# Patient Record
Sex: MALE | Race: WHITE | NOT HISPANIC OR LATINO
[De-identification: names, ages, dates, MRNs, and addresses within clinical notes are randomized per-mention and may not be internally consistent; named-entity substitution may affect disease eponyms.]

---

## 2018-12-20 ENCOUNTER — HOSPITAL (OUTPATIENT)
Dept: OTHER | Age: 33
End: 2018-12-20
Attending: INTERNAL MEDICINE

## 2018-12-24 LAB — PATHOLOGIST NAME: NORMAL

## 2021-11-08 ENCOUNTER — HOSPITAL ENCOUNTER (OUTPATIENT)
Dept: GENERAL RADIOLOGY | Facility: HOSPITAL | Age: 36
Discharge: HOME | End: 2021-11-08
Attending: SPECIALIST
Payer: COMMERCIAL

## 2021-11-08 DIAGNOSIS — M54.16 LUMBAR RADICULOPATHY: ICD-10-CM

## 2021-11-08 PROCEDURE — 72110 X-RAY EXAM L-2 SPINE 4/>VWS: CPT | Performed by: SPECIALIST

## 2021-11-15 ENCOUNTER — HOSPITAL ENCOUNTER (OUTPATIENT)
Dept: MRI IMAGING | Age: 36
Discharge: HOME | End: 2021-11-15
Attending: SPECIALIST
Payer: COMMERCIAL

## 2021-11-15 DIAGNOSIS — M54.16 LUMBAR RADICULOPATHY: ICD-10-CM

## 2021-11-15 PROCEDURE — 72148 MRI LUMBAR SPINE W/O DYE: CPT | Performed by: SPECIALIST

## 2021-12-02 ENCOUNTER — HOSPITAL ENCOUNTER (EMERGENCY)
Facility: HOSPITAL | Age: 36
Discharge: HOME | End: 2021-12-02
Attending: EMERGENCY MEDICINE
Payer: COMMERCIAL

## 2021-12-02 ENCOUNTER — APPOINTMENT (OUTPATIENT)
Dept: GENERAL RADIOLOGY | Facility: HOSPITAL | Age: 36
End: 2021-12-02
Payer: COMMERCIAL

## 2021-12-02 VITALS
HEART RATE: 66 BPM | HEIGHT: 74 IN | BODY MASS INDEX: 38.5 KG/M2 | SYSTOLIC BLOOD PRESSURE: 119 MMHG | RESPIRATION RATE: 28 BRPM | DIASTOLIC BLOOD PRESSURE: 73 MMHG | WEIGHT: 300 LBS | OXYGEN SATURATION: 98 % | TEMPERATURE: 98 F

## 2021-12-02 DIAGNOSIS — U07.1 COVID-19: Primary | ICD-10-CM

## 2021-12-02 PROCEDURE — 80053 COMPREHEN METABOLIC PANEL: CPT | Performed by: EMERGENCY MEDICINE

## 2021-12-02 PROCEDURE — 71045 X-RAY EXAM CHEST 1 VIEW: CPT | Performed by: EMERGENCY MEDICINE

## 2021-12-02 PROCEDURE — 80053 COMPREHEN METABOLIC PANEL: CPT

## 2021-12-02 PROCEDURE — 85025 COMPLETE CBC W/AUTO DIFF WBC: CPT

## 2021-12-02 PROCEDURE — 85025 COMPLETE CBC W/AUTO DIFF WBC: CPT | Performed by: EMERGENCY MEDICINE

## 2021-12-02 PROCEDURE — 99284 EMERGENCY DEPT VISIT MOD MDM: CPT

## 2021-12-02 RX ORDER — DEXTROAMPHETAMINE SACCHARATE, AMPHETAMINE ASPARTATE, DEXTROAMPHETAMINE SULFATE AND AMPHETAMINE SULFATE 7.5; 7.5; 7.5; 7.5 MG/1; MG/1; MG/1; MG/1
TABLET ORAL
COMMUNITY
Start: 2021-11-17

## 2021-12-02 RX ORDER — CLONAZEPAM 1 MG/1
TABLET ORAL
COMMUNITY
Start: 2021-11-17

## 2021-12-02 NOTE — ED PROVIDER NOTES
Patient Seen in: [facility] Emergency Department      History   Patient presents with:  Covid    Stated Complaint: PCP sent over for an IV infusion through ED    Subjective:   HPI    79-year-old male, unvaccinated for COVID-19, presents today for Co equal, round, and reactive to light. Cardiovascular:      Rate and Rhythm: Normal rate and regular rhythm. Pulmonary:      Effort: Pulmonary effort is normal.      Breath sounds: Normal breath sounds. Abdominal:      Palpations: Abdomen is soft. and hilar contours are normal.  No focal consolidation. CONCLUSION:  No acute abnormality is seen.    Dictated by (CST): Luis Eduardo Kaiser MD on 12/02/2021 at 11:05 AM     Finalized by (CST): Luis Eduardo Kaiser MD on 12/02/2021 at 11:07 AM              MDM Clinical Impression:  COVID-19  (primary encounter diagnosis)     Disposition:  Discharge  12/2/2021 11:50 am    Follow-up:   Gabi Santos [phone]    Call in 1 day        The patient has evidence of C

## 2021-12-02 NOTE — ED INITIAL ASSESSMENT (HPI)
Pt here due to his kids having COVID, he is not vaccinated, he is having, headache, chest congestion, and eye pain

## 2022-10-12 ENCOUNTER — EXTERNAL RECORD (OUTPATIENT)
Dept: OTHER | Age: 37
End: 2022-10-12

## 2022-12-12 ENCOUNTER — CLINICAL ABSTRACT (OUTPATIENT)
Dept: OTHER | Age: 37
End: 2022-12-12

## 2022-12-12 VITALS — SYSTOLIC BLOOD PRESSURE: 120 MMHG | DIASTOLIC BLOOD PRESSURE: 73 MMHG

## 2022-12-17 PROBLEM — K61.1 PERIRECTAL ABSCESS: Status: ACTIVE | Noted: 2022-12-17

## 2022-12-17 PROBLEM — K60.40 PERIRECTAL FISTULA: Status: ACTIVE | Noted: 2022-12-17

## 2022-12-17 PROBLEM — K60.4 PERIRECTAL FISTULA: Status: ACTIVE | Noted: 2022-12-17

## 2023-01-07 ENCOUNTER — LAB ENCOUNTER (OUTPATIENT)
Dept: LAB | Facility: HOSPITAL | Age: 38
End: 2023-01-07
Attending: INTERNAL MEDICINE
Payer: COMMERCIAL

## 2023-01-07 DIAGNOSIS — Z01.818 PRE-OP TESTING: ICD-10-CM

## 2023-01-07 LAB — SARS-COV-2 RNA RESP QL NAA+PROBE: NOT DETECTED

## 2023-01-10 ENCOUNTER — HOSPITAL ENCOUNTER (OUTPATIENT)
Facility: HOSPITAL | Age: 38
Setting detail: HOSPITAL OUTPATIENT SURGERY
Discharge: HOME | End: 2023-01-10
Attending: INTERNAL MEDICINE | Admitting: INTERNAL MEDICINE
Payer: COMMERCIAL

## 2023-01-10 ENCOUNTER — ANESTHESIA EVENT (OUTPATIENT)
Dept: ENDOSCOPY | Facility: HOSPITAL | Age: 38
End: 2023-01-10
Payer: COMMERCIAL

## 2023-01-10 ENCOUNTER — ANESTHESIA (OUTPATIENT)
Dept: ENDOSCOPY | Facility: HOSPITAL | Age: 38
End: 2023-01-10
Payer: COMMERCIAL

## 2023-01-10 ENCOUNTER — OFFICE VISIT (OUTPATIENT)
Facility: LOCATION | Age: 38
End: 2023-01-10
Payer: COMMERCIAL

## 2023-01-10 VITALS — HEART RATE: 96 BPM | TEMPERATURE: 97 F

## 2023-01-10 VITALS
HEART RATE: 68 BPM | HEIGHT: 74 IN | DIASTOLIC BLOOD PRESSURE: 54 MMHG | BODY MASS INDEX: 40.17 KG/M2 | WEIGHT: 313 LBS | TEMPERATURE: 98 F | RESPIRATION RATE: 16 BRPM | SYSTOLIC BLOOD PRESSURE: 102 MMHG | OXYGEN SATURATION: 100 %

## 2023-01-10 DIAGNOSIS — Z01.818 PRE-OP TESTING: Primary | ICD-10-CM

## 2023-01-10 DIAGNOSIS — K61.1 PERIRECTAL ABSCESS: ICD-10-CM

## 2023-01-10 DIAGNOSIS — K60.4 PERIRECTAL FISTULA: Primary | ICD-10-CM

## 2023-01-10 DIAGNOSIS — K60.4 PERIRECTAL FISTULA: ICD-10-CM

## 2023-01-10 PROCEDURE — 88305 TISSUE EXAM BY PATHOLOGIST: CPT | Performed by: INTERNAL MEDICINE

## 2023-01-10 PROCEDURE — 0DJD8ZZ INSPECTION OF LOWER INTESTINAL TRACT, VIA NATURAL OR ARTIFICIAL OPENING ENDOSCOPIC: ICD-10-PCS | Performed by: INTERNAL MEDICINE

## 2023-01-10 PROCEDURE — 88342 IMHCHEM/IMCYTCHM 1ST ANTB: CPT | Performed by: INTERNAL MEDICINE

## 2023-01-10 PROCEDURE — 0DBB8ZX EXCISION OF ILEUM, VIA NATURAL OR ARTIFICIAL OPENING ENDOSCOPIC, DIAGNOSTIC: ICD-10-PCS | Performed by: INTERNAL MEDICINE

## 2023-01-10 PROCEDURE — 88341 IMHCHEM/IMCYTCHM EA ADD ANTB: CPT | Performed by: INTERNAL MEDICINE

## 2023-01-10 PROCEDURE — 0DBE8ZX EXCISION OF LARGE INTESTINE, VIA NATURAL OR ARTIFICIAL OPENING ENDOSCOPIC, DIAGNOSTIC: ICD-10-PCS | Performed by: INTERNAL MEDICINE

## 2023-01-10 RX ORDER — LIDOCAINE HYDROCHLORIDE 10 MG/ML
INJECTION, SOLUTION EPIDURAL; INFILTRATION; INTRACAUDAL; PERINEURAL AS NEEDED
Status: DISCONTINUED | OUTPATIENT
Start: 2023-01-10 | End: 2023-01-10 | Stop reason: SURG

## 2023-01-10 RX ORDER — SODIUM CHLORIDE, SODIUM LACTATE, POTASSIUM CHLORIDE, CALCIUM CHLORIDE 600; 310; 30; 20 MG/100ML; MG/100ML; MG/100ML; MG/100ML
INJECTION, SOLUTION INTRAVENOUS CONTINUOUS
Status: DISCONTINUED | OUTPATIENT
Start: 2023-01-10 | End: 2023-01-10

## 2023-01-10 RX ADMIN — LIDOCAINE HYDROCHLORIDE 50 MG: 10 INJECTION, SOLUTION EPIDURAL; INFILTRATION; INTRACAUDAL; PERINEURAL at 11:40:00

## 2023-01-10 NOTE — OPERATIVE REPORT
Geoffrey Candelaria Patient Status:  Hospital Outpatient Surgery    1985 MRN [id number]   Location [id number] [place] [address] Attending Kendell Rodriguez MD   Date 1/10/2023 PCP Ivis Calderon MD     PREOPERATIVE DIAGNOSIS/INDICATION: Family ho colon cancer, perianal possible fistula  POSTOPERTATIVE DIAGNOSIS: Same, ileitis  PROCEDURE PERFORMED: COLONOSCOPY/EUS  SEDATION: MAC sedation provided by General Anesthesia    TIME OUT WAS PERFORMED    INFORMED CONSENT: Risks, benefits and alternatives to the procedure were explained to the patient including but not limited to bleeding, infection, perforation, adverse drug reactions, pancreatitis and the need for hospitalization and surgery if this occurs, the patient understands and agrees to procedure. PROCEDURE DESCRIPTION: After careful digital rectal examination a pediatric colonoscope was introduced into the patients rectum, advanced pass the recto sigmoid junction, into the descending colon, splenic flexure, transverse colon, hepatic flexure, ascending colon, cecum and the last 5-10cm of the terminal ileum, confirmed by landmarks, including the appendiceal orifice and ileocecal valve. Careful examination of the above described areas was performed on withdrawal of the endoscope. Retroflexion was performed on the rectum, after this a radial echoendoscope was introduced into the rectum and advanced to the sigmoid colon. The patient tolerated the procedure well, there were no immediate complication immediately following the procedure, and the patient was transferred to recovery in stable condition.   QUALITY OF PREPARATION: Crosby Bowel Preparation Scale:            -      Right colon 3, Transverse colon 3, Left colon 3   FINDINGS/THERAPEUTICS:  - TERMINAL ILEUM: Aphthous ulceration and erosions of the terminal ileum s/p cold forceps biopsies  - COLON: Normal mucosa s/p cold forceps biopsies  - RECTUM: Grade 2  Internal hemorrhoids  - EUS: There was a fistula tract transsphincteric type 2  RECOMMENDATIONS:   - Post Colonoscopy/biopsy precautions, watch for bleeding, infection, perforation, adverse drug reactions   - Follow biopsies.  - Follow with colorectal surgery  - Repeat colonoscopy in 5 years.     Swati Hurtado MD  1/10/2023  12:06 PM

## 2023-01-10 NOTE — ANESTHESIA POSTPROCEDURE EVALUATION
[address] Patient Status:  Hospital Outpatient Surgery   Age/Gender 30year old male MRN [id number]   Location [address] [address] Attending Kendell Rodriguez MD   Hosp Day # 0 PCP Ivis Calderon MD       Anesthesia Post-op Note    ENDOSCOPIC ULTRASOUND (EUS), COLONOSCOPY with biopsy    Procedure Summary     Date: 01/10/23 Room / Location: [facility] ENDOSCOPY 02 / [facility] ENDOSCOPY    Anesthesia Start: 4557 Anesthesia Stop: 7027    Procedures:       ENDOSCOPIC ULTRASOUND (EUS), COLONOSCOPY with biopsy      ENDOSCOPIC ULTRASOUND (EUS), COLONOSCOPY Diagnosis:       Perirectal abscess      Perirectal fistula      (hermorrhoids, ileitis  EUS; perianal fistula )    Surgeons: Kendell Rodriguez MD Anesthesiologist: Jonatan Tellez MD    Anesthesia Type: MAC ASA Status: 2          Anesthesia Type: MAC    Vitals Value Taken Time   /57 01/10/23 1213   Temp  01/10/23 1218   Pulse 62 01/10/23 1217   Resp 16 01/10/23 1210   SpO2 97 % 01/10/23 1217   Vitals shown include unvalidated device data. Patient Location: Endoscopy    Anesthesia Type: MAC    Airway Patency: patent    Postop Pain Control: adequate    Mental Status: mildly sedated but able to meaningfully participate in the post-anesthesia evaluation    Nausea/Vomiting: none    Cardiopulmonary/Hydration status: stable euvolemic    Complications: no apparent anesthesia related complications    Postop vital signs: stable    Dental Exam: Unchanged from Preop    Patient to be discharged home when criteria met.

## 2023-01-10 NOTE — DISCHARGE INSTRUCTIONS
Home Care Instructions for Colonoscopy and/or Endoscopic Ultrasound (EUS) with Sedation    Diet:  - Resume your regular diet as tolerated unless otherwise instructed. - Start with light meals to minimize bloating.  - Do not drink alcohol today. Medication:  - If you have questions about resuming your normal medications, please contact your Primary Care Physician. Activities:  - Take it easy today. Do not return to work today. - Do not drive today. - Do not operate any machinery today (including kitchen equipment). Colonoscopy:  - You may notice some rectal \"spotting\" (a little blood on the toilet tissue) for a day or two after the exam. This is normal.  - If you experience any rectal bleeding (not spotting), persistent tenderness or sharp severe abdominal pains, oral temperature over 100 degrees Fahrenheit, light-headedness or dizziness, or any other problems, contact your doctor. EUS:  - You may have a sore throat for 2-3 days following the exam. This is normal. Gargling with warm salt water (1/2 tsp salt to 1 glass warm water) or using throat lozenges will help. - If you experience any sharp pain in your neck, abdomen or chest, vomiting of blood, oral temperature over 100 degrees Fahrenheit, light-headedness or dizziness, or any other problems, contact your doctor. **If unable to reach your doctor, please go to the [facility] Emergency Room**    - Your referring physician will receive a full report of your examination.  - If you do not hear from your doctor's office within two weeks of your biopsy, please call them for your results. You may be able to see your laboratory results in [facility] between 4 and 7 business days. In some cases, your physician may not have viewed the results before they are released to [address] If you have questions regarding your results contact the physician who ordered the test/exam by phone or via [address] by choosing \"Ask a Medical Question. \"

## 2023-01-19 ENCOUNTER — OFFICE VISIT (OUTPATIENT)
Facility: LOCATION | Age: 38
End: 2023-01-19
Payer: COMMERCIAL

## 2023-01-19 VITALS — HEART RATE: 63 BPM | TEMPERATURE: 97 F

## 2023-01-19 DIAGNOSIS — K60.4 PERIRECTAL FISTULA: Primary | ICD-10-CM

## 2023-01-19 PROCEDURE — 46600 DIAGNOSTIC ANOSCOPY SPX: CPT | Performed by: STUDENT IN AN ORGANIZED HEALTH CARE EDUCATION/TRAINING PROGRAM

## 2023-01-19 PROCEDURE — 99203 OFFICE O/P NEW LOW 30 MIN: CPT | Performed by: STUDENT IN AN ORGANIZED HEALTH CARE EDUCATION/TRAINING PROGRAM

## 2023-03-06 ENCOUNTER — OFFICE VISIT (OUTPATIENT)
Facility: LOCATION | Age: 38
End: 2023-03-06
Payer: COMMERCIAL

## 2023-03-06 VITALS — HEART RATE: 66 BPM | TEMPERATURE: 99 F

## 2023-03-06 DIAGNOSIS — K60.3 ANAL FISTULA: Primary | ICD-10-CM

## 2023-03-15 ENCOUNTER — LAB ENCOUNTER (OUTPATIENT)
Dept: LAB | Age: 38
End: 2023-03-15
Attending: STUDENT IN AN ORGANIZED HEALTH CARE EDUCATION/TRAINING PROGRAM
Payer: COMMERCIAL

## 2023-03-15 DIAGNOSIS — Z01.818 PRE-OP TESTING: ICD-10-CM

## 2023-03-16 ENCOUNTER — ANESTHESIA EVENT (OUTPATIENT)
Dept: SURGERY | Facility: HOSPITAL | Age: 38
End: 2023-03-16
Payer: COMMERCIAL

## 2023-03-16 LAB — SARS-COV-2 RNA RESP QL NAA+PROBE: NOT DETECTED

## 2023-03-17 ENCOUNTER — HOSPITAL ENCOUNTER (OUTPATIENT)
Facility: HOSPITAL | Age: 38
Setting detail: HOSPITAL OUTPATIENT SURGERY
Discharge: HOME | End: 2023-03-17
Attending: STUDENT IN AN ORGANIZED HEALTH CARE EDUCATION/TRAINING PROGRAM | Admitting: STUDENT IN AN ORGANIZED HEALTH CARE EDUCATION/TRAINING PROGRAM
Payer: COMMERCIAL

## 2023-03-17 ENCOUNTER — ANESTHESIA (OUTPATIENT)
Dept: SURGERY | Facility: HOSPITAL | Age: 38
End: 2023-03-17
Payer: COMMERCIAL

## 2023-03-17 VITALS
OXYGEN SATURATION: 96 % | HEIGHT: 74 IN | BODY MASS INDEX: 39.53 KG/M2 | DIASTOLIC BLOOD PRESSURE: 74 MMHG | SYSTOLIC BLOOD PRESSURE: 117 MMHG | WEIGHT: 308 LBS | HEART RATE: 53 BPM | TEMPERATURE: 98 F | RESPIRATION RATE: 16 BRPM

## 2023-03-17 DIAGNOSIS — K60.3 ANAL FISTULA: ICD-10-CM

## 2023-03-17 DIAGNOSIS — Z01.818 PRE-OP TESTING: Primary | ICD-10-CM

## 2023-03-17 PROCEDURE — 46606 ANOSCOPY AND BIOPSY: CPT | Performed by: STUDENT IN AN ORGANIZED HEALTH CARE EDUCATION/TRAINING PROGRAM

## 2023-03-17 PROCEDURE — 0DBQ8ZZ EXCISION OF ANUS, VIA NATURAL OR ARTIFICIAL OPENING ENDOSCOPIC: ICD-10-PCS | Performed by: STUDENT IN AN ORGANIZED HEALTH CARE EDUCATION/TRAINING PROGRAM

## 2023-03-17 PROCEDURE — 46060 I&D ISCHIORECTAL/NTRMRL ABSC: CPT | Performed by: STUDENT IN AN ORGANIZED HEALTH CARE EDUCATION/TRAINING PROGRAM

## 2023-03-17 PROCEDURE — 0DBP8ZZ EXCISION OF RECTUM, VIA NATURAL OR ARTIFICIAL OPENING ENDOSCOPIC: ICD-10-PCS | Performed by: STUDENT IN AN ORGANIZED HEALTH CARE EDUCATION/TRAINING PROGRAM

## 2023-03-17 RX ORDER — CEFOXITIN 2 G/1
INJECTION, POWDER, FOR SOLUTION INTRAVENOUS
Status: DISCONTINUED
Start: 2023-03-17 | End: 2023-03-17

## 2023-03-17 RX ORDER — PROCHLORPERAZINE EDISYLATE 5 MG/ML
5 INJECTION INTRAMUSCULAR; INTRAVENOUS EVERY 8 HOURS PRN
Status: DISCONTINUED | OUTPATIENT
Start: 2023-03-17 | End: 2023-03-17

## 2023-03-17 RX ORDER — HYDROMORPHONE HYDROCHLORIDE 1 MG/ML
0.6 INJECTION, SOLUTION INTRAMUSCULAR; INTRAVENOUS; SUBCUTANEOUS EVERY 5 MIN PRN
Status: DISCONTINUED | OUTPATIENT
Start: 2023-03-17 | End: 2023-03-17

## 2023-03-17 RX ORDER — SODIUM CHLORIDE, SODIUM LACTATE, POTASSIUM CHLORIDE, CALCIUM CHLORIDE 600; 310; 30; 20 MG/100ML; MG/100ML; MG/100ML; MG/100ML
INJECTION, SOLUTION INTRAVENOUS CONTINUOUS
Status: DISCONTINUED | OUTPATIENT
Start: 2023-03-17 | End: 2023-03-17

## 2023-03-17 RX ORDER — MIDAZOLAM HYDROCHLORIDE 1 MG/ML
1 INJECTION INTRAMUSCULAR; INTRAVENOUS EVERY 5 MIN PRN
Status: DISCONTINUED | OUTPATIENT
Start: 2023-03-17 | End: 2023-03-17

## 2023-03-17 RX ORDER — MEPERIDINE HYDROCHLORIDE 25 MG/ML
12.5 INJECTION INTRAMUSCULAR; INTRAVENOUS; SUBCUTANEOUS AS NEEDED
Status: DISCONTINUED | OUTPATIENT
Start: 2023-03-17 | End: 2023-03-17

## 2023-03-17 RX ORDER — BUPIVACAINE HYDROCHLORIDE 2.5 MG/ML
INJECTION, SOLUTION EPIDURAL; INFILTRATION; INTRACAUDAL AS NEEDED
Status: DISCONTINUED | OUTPATIENT
Start: 2023-03-17 | End: 2023-03-17 | Stop reason: HOSPADM

## 2023-03-17 RX ORDER — NALOXONE HYDROCHLORIDE 0.4 MG/ML
80 INJECTION, SOLUTION INTRAMUSCULAR; INTRAVENOUS; SUBCUTANEOUS AS NEEDED
Status: DISCONTINUED | OUTPATIENT
Start: 2023-03-17 | End: 2023-03-17

## 2023-03-17 RX ORDER — HYDROCODONE BITARTRATE AND ACETAMINOPHEN 5; 325 MG/1; MG/1
1 TABLET ORAL EVERY 6 HOURS PRN
Qty: 20 TABLET | Refills: 0 | Status: SHIPPED | OUTPATIENT
Start: 2023-03-17

## 2023-03-17 RX ORDER — HYDROCODONE BITARTRATE AND ACETAMINOPHEN 10; 325 MG/1; MG/1
1 TABLET ORAL ONCE AS NEEDED
Status: DISCONTINUED | OUTPATIENT
Start: 2023-03-17 | End: 2023-03-17

## 2023-03-17 RX ORDER — ROCURONIUM BROMIDE 10 MG/ML
INJECTION, SOLUTION INTRAVENOUS AS NEEDED
Status: DISCONTINUED | OUTPATIENT
Start: 2023-03-17 | End: 2023-03-17 | Stop reason: SURG

## 2023-03-17 RX ORDER — HYDROCODONE BITARTRATE AND ACETAMINOPHEN 10; 325 MG/1; MG/1
2 TABLET ORAL ONCE AS NEEDED
Status: DISCONTINUED | OUTPATIENT
Start: 2023-03-17 | End: 2023-03-17

## 2023-03-17 RX ORDER — LABETALOL HYDROCHLORIDE 5 MG/ML
5 INJECTION, SOLUTION INTRAVENOUS EVERY 5 MIN PRN
Status: DISCONTINUED | OUTPATIENT
Start: 2023-03-17 | End: 2023-03-17

## 2023-03-17 RX ORDER — GLYCOPYRROLATE 0.2 MG/ML
INJECTION, SOLUTION INTRAMUSCULAR; INTRAVENOUS AS NEEDED
Status: DISCONTINUED | OUTPATIENT
Start: 2023-03-17 | End: 2023-03-17 | Stop reason: SURG

## 2023-03-17 RX ORDER — SCOLOPAMINE TRANSDERMAL SYSTEM 1 MG/1
1 PATCH, EXTENDED RELEASE TRANSDERMAL ONCE
Status: DISCONTINUED | OUTPATIENT
Start: 2023-03-17 | End: 2023-03-17 | Stop reason: HOSPADM

## 2023-03-17 RX ORDER — ONDANSETRON 2 MG/ML
4 INJECTION INTRAMUSCULAR; INTRAVENOUS EVERY 6 HOURS PRN
Status: DISCONTINUED | OUTPATIENT
Start: 2023-03-17 | End: 2023-03-17

## 2023-03-17 RX ORDER — HYDROMORPHONE HYDROCHLORIDE 1 MG/ML
0.4 INJECTION, SOLUTION INTRAMUSCULAR; INTRAVENOUS; SUBCUTANEOUS EVERY 5 MIN PRN
Status: DISCONTINUED | OUTPATIENT
Start: 2023-03-17 | End: 2023-03-17

## 2023-03-17 RX ORDER — ACETAMINOPHEN 500 MG
1000 TABLET ORAL ONCE AS NEEDED
Status: DISCONTINUED | OUTPATIENT
Start: 2023-03-17 | End: 2023-03-17

## 2023-03-17 RX ORDER — LIDOCAINE HYDROCHLORIDE 10 MG/ML
INJECTION, SOLUTION EPIDURAL; INFILTRATION; INTRACAUDAL; PERINEURAL AS NEEDED
Status: DISCONTINUED | OUTPATIENT
Start: 2023-03-17 | End: 2023-03-17 | Stop reason: SURG

## 2023-03-17 RX ORDER — KETOROLAC TROMETHAMINE 30 MG/ML
INJECTION, SOLUTION INTRAMUSCULAR; INTRAVENOUS AS NEEDED
Status: DISCONTINUED | OUTPATIENT
Start: 2023-03-17 | End: 2023-03-17 | Stop reason: SURG

## 2023-03-17 RX ORDER — 0.9 % SODIUM CHLORIDE 0.9 %
INTRAVENOUS SOLUTION INTRAVENOUS
Status: DISCONTINUED
Start: 2023-03-17 | End: 2023-03-17

## 2023-03-17 RX ORDER — ONDANSETRON 2 MG/ML
INJECTION INTRAMUSCULAR; INTRAVENOUS AS NEEDED
Status: DISCONTINUED | OUTPATIENT
Start: 2023-03-17 | End: 2023-03-17 | Stop reason: SURG

## 2023-03-17 RX ORDER — DEXAMETHASONE SODIUM PHOSPHATE 4 MG/ML
VIAL (ML) INJECTION AS NEEDED
Status: DISCONTINUED | OUTPATIENT
Start: 2023-03-17 | End: 2023-03-17 | Stop reason: SURG

## 2023-03-17 RX ORDER — ACETAMINOPHEN 500 MG
1000 TABLET ORAL ONCE
Status: DISCONTINUED | OUTPATIENT
Start: 2023-03-17 | End: 2023-03-17 | Stop reason: HOSPADM

## 2023-03-17 RX ORDER — NEOSTIGMINE METHYLSULFATE 1 MG/ML
INJECTION, SOLUTION INTRAVENOUS AS NEEDED
Status: DISCONTINUED | OUTPATIENT
Start: 2023-03-17 | End: 2023-03-17 | Stop reason: SURG

## 2023-03-17 RX ORDER — HYDROMORPHONE HYDROCHLORIDE 1 MG/ML
0.2 INJECTION, SOLUTION INTRAMUSCULAR; INTRAVENOUS; SUBCUTANEOUS EVERY 5 MIN PRN
Status: DISCONTINUED | OUTPATIENT
Start: 2023-03-17 | End: 2023-03-17

## 2023-03-17 RX ADMIN — ROCURONIUM BROMIDE 50 MG: 10 INJECTION, SOLUTION INTRAVENOUS at 12:54:00

## 2023-03-17 RX ADMIN — SODIUM CHLORIDE, SODIUM LACTATE, POTASSIUM CHLORIDE, CALCIUM CHLORIDE: 600; 310; 30; 20 INJECTION, SOLUTION INTRAVENOUS at 14:11:00

## 2023-03-17 RX ADMIN — ONDANSETRON 4 MG: 2 INJECTION INTRAMUSCULAR; INTRAVENOUS at 13:56:00

## 2023-03-17 RX ADMIN — GLYCOPYRROLATE 0.4 MG: 0.2 INJECTION, SOLUTION INTRAMUSCULAR; INTRAVENOUS at 14:02:00

## 2023-03-17 RX ADMIN — KETOROLAC TROMETHAMINE 30 MG: 30 INJECTION, SOLUTION INTRAMUSCULAR; INTRAVENOUS at 13:56:00

## 2023-03-17 RX ADMIN — LIDOCAINE HYDROCHLORIDE 50 MG: 10 INJECTION, SOLUTION EPIDURAL; INFILTRATION; INTRACAUDAL; PERINEURAL at 12:54:00

## 2023-03-17 RX ADMIN — ROCURONIUM BROMIDE 10 MG: 10 INJECTION, SOLUTION INTRAVENOUS at 13:39:00

## 2023-03-17 RX ADMIN — DEXAMETHASONE SODIUM PHOSPHATE 8 MG: 4 MG/ML VIAL (ML) INJECTION at 12:54:00

## 2023-03-17 RX ADMIN — NEOSTIGMINE METHYLSULFATE 5 MG: 1 INJECTION, SOLUTION INTRAVENOUS at 14:02:00

## 2023-03-17 NOTE — INTERVAL H&P NOTE
Pre-op Diagnosis: Anal fistula [K60.3]    The above referenced H&P was reviewed by Mi Lagos MD on 3/17/2023, the patient was examined and no significant changes have occurred in the patient's condition since the H&P was performed. I discussed with the patient and/or legal representative the potential benefits, risks and side effects of this procedure; the likelihood of the patient achieving goals; and potential problems that might occur during recuperation. I discussed reasonable alternatives to the procedure, including risks, benefits and side effects related to the alternatives and risks related to not receiving this procedure. We will proceed with procedure as planned.

## 2023-03-17 NOTE — ANESTHESIA PROCEDURE NOTES
Airway  Date/Time: 3/17/2023 12:56 PM  Urgency: elective    Airway not difficult    General Information and Staff    Patient location during procedure: OR  Anesthesiologist: Padmini Cid MD  Performed: anesthesiologist   Performed by: Padmini Cid MD  Authorized by: Padmini Cid MD      Indications and Patient Condition  Indications for airway management: anesthesia  Sedation level: deep  Preoxygenated: yes  Patient position: sniffing  Mask difficulty assessment: 1 - vent by mask    Final Airway Details  Final airway type: endotracheal airway      Successful airway: ETT  Cuffed: yes   Successful intubation technique: direct laryngoscopy  Endotracheal tube insertion site: oral  Blade: Demarcus  Blade size: #4  ETT size (mm): 7.0    Cormack-Lehane Classification: grade I - full view of glottis  Placement verified by: chest auscultation and capnometry   Measured from: lips  ETT to lips (cm): 22  Number of attempts at approach: 1

## 2023-04-03 ENCOUNTER — OFFICE VISIT (OUTPATIENT)
Facility: LOCATION | Age: 38
End: 2023-04-03

## 2023-04-03 VITALS — TEMPERATURE: 99 F | HEART RATE: 63 BPM

## 2023-04-03 DIAGNOSIS — K60.3 ANAL FISTULA: Primary | ICD-10-CM

## 2023-04-03 PROCEDURE — 99024 POSTOP FOLLOW-UP VISIT: CPT | Performed by: STUDENT IN AN ORGANIZED HEALTH CARE EDUCATION/TRAINING PROGRAM

## 2023-04-17 RX ORDER — HEPARIN SODIUM 5000 [USP'U]/ML
5000 INJECTION, SOLUTION INTRAVENOUS; SUBCUTANEOUS ONCE
Status: CANCELLED | OUTPATIENT
Start: 2023-04-17 | End: 2023-04-17

## 2023-04-26 ENCOUNTER — TELEPHONE (OUTPATIENT)
Facility: LOCATION | Age: 38
End: 2023-04-26

## 2023-04-26 DIAGNOSIS — K60.3 ANAL FISTULA: Primary | ICD-10-CM

## 2023-05-02 ENCOUNTER — ANESTHESIA EVENT (OUTPATIENT)
Dept: SURGERY | Facility: HOSPITAL | Age: 38
End: 2023-05-02
Payer: COMMERCIAL

## 2023-05-02 ENCOUNTER — TELEPHONE (OUTPATIENT)
Facility: LOCATION | Age: 38
End: 2023-05-02

## 2023-05-02 NOTE — TELEPHONE ENCOUNTER
Pt called to inform that on Monday 5/1 his insurance changed. He didn't realize that the insurance would change until last-minute. Pt does not have any insurance information. He said to call Roslyn Cummings at [phone] for insurance inforamiton.      Tried to call Taryn Matta, and left a voicemail for a call back

## 2023-05-03 ENCOUNTER — ANESTHESIA (OUTPATIENT)
Dept: SURGERY | Facility: HOSPITAL | Age: 38
End: 2023-05-03
Payer: COMMERCIAL

## 2023-05-03 ENCOUNTER — HOSPITAL ENCOUNTER (OUTPATIENT)
Facility: HOSPITAL | Age: 38
Setting detail: HOSPITAL OUTPATIENT SURGERY
Discharge: HOME | End: 2023-05-03
Attending: STUDENT IN AN ORGANIZED HEALTH CARE EDUCATION/TRAINING PROGRAM | Admitting: STUDENT IN AN ORGANIZED HEALTH CARE EDUCATION/TRAINING PROGRAM
Payer: COMMERCIAL

## 2023-05-03 VITALS
OXYGEN SATURATION: 97 % | HEIGHT: 74 IN | SYSTOLIC BLOOD PRESSURE: 130 MMHG | WEIGHT: 300 LBS | DIASTOLIC BLOOD PRESSURE: 74 MMHG | BODY MASS INDEX: 38.5 KG/M2 | RESPIRATION RATE: 20 BRPM | TEMPERATURE: 98 F | HEART RATE: 60 BPM

## 2023-05-03 DIAGNOSIS — K60.3 ANAL FISTULA: ICD-10-CM

## 2023-05-03 PROCEDURE — 46285 REMOVE ANAL FIST 2 STAGE: CPT | Performed by: STUDENT IN AN ORGANIZED HEALTH CARE EDUCATION/TRAINING PROGRAM

## 2023-05-03 PROCEDURE — 0DBQ7ZZ EXCISION OF ANUS, VIA NATURAL OR ARTIFICIAL OPENING: ICD-10-PCS | Performed by: STUDENT IN AN ORGANIZED HEALTH CARE EDUCATION/TRAINING PROGRAM

## 2023-05-03 RX ORDER — ACETAMINOPHEN 500 MG
1000 TABLET ORAL ONCE AS NEEDED
Status: DISCONTINUED | OUTPATIENT
Start: 2023-05-03 | End: 2023-05-03

## 2023-05-03 RX ORDER — ONDANSETRON 2 MG/ML
4 INJECTION INTRAMUSCULAR; INTRAVENOUS EVERY 6 HOURS PRN
Status: DISCONTINUED | OUTPATIENT
Start: 2023-05-03 | End: 2023-05-03

## 2023-05-03 RX ORDER — HYDROMORPHONE HYDROCHLORIDE 1 MG/ML
0.6 INJECTION, SOLUTION INTRAMUSCULAR; INTRAVENOUS; SUBCUTANEOUS EVERY 5 MIN PRN
Status: DISCONTINUED | OUTPATIENT
Start: 2023-05-03 | End: 2023-05-03

## 2023-05-03 RX ORDER — ALBUTEROL SULFATE 90 UG/1
1 AEROSOL, METERED RESPIRATORY (INHALATION) EVERY 4 HOURS PRN
COMMUNITY
Start: 2023-04-11

## 2023-05-03 RX ORDER — DEXAMETHASONE SODIUM PHOSPHATE 4 MG/ML
VIAL (ML) INJECTION AS NEEDED
Status: DISCONTINUED | OUTPATIENT
Start: 2023-05-03 | End: 2023-05-03 | Stop reason: SURG

## 2023-05-03 RX ORDER — ONDANSETRON 2 MG/ML
INJECTION INTRAMUSCULAR; INTRAVENOUS AS NEEDED
Status: DISCONTINUED | OUTPATIENT
Start: 2023-05-03 | End: 2023-05-03 | Stop reason: SURG

## 2023-05-03 RX ORDER — HYDROMORPHONE HYDROCHLORIDE 1 MG/ML
0.2 INJECTION, SOLUTION INTRAMUSCULAR; INTRAVENOUS; SUBCUTANEOUS EVERY 5 MIN PRN
Status: DISCONTINUED | OUTPATIENT
Start: 2023-05-03 | End: 2023-05-03

## 2023-05-03 RX ORDER — HYDROCODONE BITARTRATE AND ACETAMINOPHEN 7.5; 325 MG/1; MG/1
1 TABLET ORAL EVERY 6 HOURS PRN
Qty: 15 TABLET | Refills: 0 | Status: SHIPPED | OUTPATIENT
Start: 2023-05-03

## 2023-05-03 RX ORDER — HYDROCODONE BITARTRATE AND ACETAMINOPHEN 5; 325 MG/1; MG/1
2 TABLET ORAL ONCE AS NEEDED
Status: DISCONTINUED | OUTPATIENT
Start: 2023-05-03 | End: 2023-05-03

## 2023-05-03 RX ORDER — PROCHLORPERAZINE EDISYLATE 5 MG/ML
5 INJECTION INTRAMUSCULAR; INTRAVENOUS EVERY 8 HOURS PRN
Status: DISCONTINUED | OUTPATIENT
Start: 2023-05-03 | End: 2023-05-03

## 2023-05-03 RX ORDER — KETOROLAC TROMETHAMINE 30 MG/ML
INJECTION, SOLUTION INTRAMUSCULAR; INTRAVENOUS AS NEEDED
Status: DISCONTINUED | OUTPATIENT
Start: 2023-05-03 | End: 2023-05-03 | Stop reason: SURG

## 2023-05-03 RX ORDER — HYDROMORPHONE HYDROCHLORIDE 1 MG/ML
0.4 INJECTION, SOLUTION INTRAMUSCULAR; INTRAVENOUS; SUBCUTANEOUS EVERY 5 MIN PRN
Status: DISCONTINUED | OUTPATIENT
Start: 2023-05-03 | End: 2023-05-03

## 2023-05-03 RX ORDER — SCOLOPAMINE TRANSDERMAL SYSTEM 1 MG/1
1 PATCH, EXTENDED RELEASE TRANSDERMAL ONCE
Status: DISCONTINUED | OUTPATIENT
Start: 2023-05-03 | End: 2023-05-03 | Stop reason: HOSPADM

## 2023-05-03 RX ORDER — SODIUM CHLORIDE, SODIUM LACTATE, POTASSIUM CHLORIDE, CALCIUM CHLORIDE 600; 310; 30; 20 MG/100ML; MG/100ML; MG/100ML; MG/100ML
INJECTION, SOLUTION INTRAVENOUS CONTINUOUS
Status: DISCONTINUED | OUTPATIENT
Start: 2023-05-03 | End: 2023-05-03

## 2023-05-03 RX ORDER — ROCURONIUM BROMIDE 10 MG/ML
INJECTION, SOLUTION INTRAVENOUS AS NEEDED
Status: DISCONTINUED | OUTPATIENT
Start: 2023-05-03 | End: 2023-05-03 | Stop reason: SURG

## 2023-05-03 RX ORDER — BUPIVACAINE HYDROCHLORIDE AND EPINEPHRINE 2.5; 5 MG/ML; UG/ML
INJECTION, SOLUTION EPIDURAL; INFILTRATION; INTRACAUDAL; PERINEURAL AS NEEDED
Status: DISCONTINUED | OUTPATIENT
Start: 2023-05-03 | End: 2023-05-03 | Stop reason: HOSPADM

## 2023-05-03 RX ORDER — LIDOCAINE HYDROCHLORIDE 10 MG/ML
INJECTION, SOLUTION EPIDURAL; INFILTRATION; INTRACAUDAL; PERINEURAL AS NEEDED
Status: DISCONTINUED | OUTPATIENT
Start: 2023-05-03 | End: 2023-05-03 | Stop reason: SURG

## 2023-05-03 RX ORDER — GLYCOPYRROLATE 0.2 MG/ML
INJECTION, SOLUTION INTRAMUSCULAR; INTRAVENOUS AS NEEDED
Status: DISCONTINUED | OUTPATIENT
Start: 2023-05-03 | End: 2023-05-03 | Stop reason: SURG

## 2023-05-03 RX ORDER — 0.9 % SODIUM CHLORIDE 0.9 %
INTRAVENOUS SOLUTION INTRAVENOUS
Status: DISCONTINUED
Start: 2023-05-03 | End: 2023-05-03

## 2023-05-03 RX ORDER — CEFOXITIN 2 G/1
INJECTION, POWDER, FOR SOLUTION INTRAVENOUS
Status: DISCONTINUED
Start: 2023-05-03 | End: 2023-05-03

## 2023-05-03 RX ORDER — NALOXONE HYDROCHLORIDE 0.4 MG/ML
80 INJECTION, SOLUTION INTRAMUSCULAR; INTRAVENOUS; SUBCUTANEOUS AS NEEDED
Status: DISCONTINUED | OUTPATIENT
Start: 2023-05-03 | End: 2023-05-03

## 2023-05-03 RX ORDER — NEOSTIGMINE METHYLSULFATE 1 MG/ML
INJECTION, SOLUTION INTRAVENOUS AS NEEDED
Status: DISCONTINUED | OUTPATIENT
Start: 2023-05-03 | End: 2023-05-03 | Stop reason: SURG

## 2023-05-03 RX ORDER — ACETAMINOPHEN 500 MG
1000 TABLET ORAL ONCE
Status: DISCONTINUED | OUTPATIENT
Start: 2023-05-03 | End: 2023-05-03 | Stop reason: HOSPADM

## 2023-05-03 RX ORDER — HYDROCODONE BITARTRATE AND ACETAMINOPHEN 5; 325 MG/1; MG/1
1 TABLET ORAL ONCE AS NEEDED
Status: DISCONTINUED | OUTPATIENT
Start: 2023-05-03 | End: 2023-05-03

## 2023-05-03 RX ADMIN — NEOSTIGMINE METHYLSULFATE 3 MG: 1 INJECTION, SOLUTION INTRAVENOUS at 12:43:00

## 2023-05-03 RX ADMIN — ONDANSETRON 4 MG: 2 INJECTION INTRAMUSCULAR; INTRAVENOUS at 12:43:00

## 2023-05-03 RX ADMIN — LIDOCAINE HYDROCHLORIDE 100 MG: 10 INJECTION, SOLUTION EPIDURAL; INFILTRATION; INTRACAUDAL; PERINEURAL at 11:59:00

## 2023-05-03 RX ADMIN — SODIUM CHLORIDE, SODIUM LACTATE, POTASSIUM CHLORIDE, CALCIUM CHLORIDE: 600; 310; 30; 20 INJECTION, SOLUTION INTRAVENOUS at 11:56:00

## 2023-05-03 RX ADMIN — GLYCOPYRROLATE 0.4 MG: 0.2 INJECTION, SOLUTION INTRAMUSCULAR; INTRAVENOUS at 12:43:00

## 2023-05-03 RX ADMIN — ROCURONIUM BROMIDE 50 MG: 10 INJECTION, SOLUTION INTRAVENOUS at 11:59:00

## 2023-05-03 RX ADMIN — DEXAMETHASONE SODIUM PHOSPHATE 4 MG: 4 MG/ML VIAL (ML) INJECTION at 12:21:00

## 2023-05-03 RX ADMIN — KETOROLAC TROMETHAMINE 30 MG: 30 INJECTION, SOLUTION INTRAMUSCULAR; INTRAVENOUS at 12:43:00

## 2023-05-03 NOTE — ANESTHESIA PROCEDURE NOTES
Airway  Date/Time: 5/3/2023 12:03 PM  Urgency: elective    Airway not difficult    General Information and Staff    Patient location during procedure: OR  Anesthesiologist: Kenny Antoine MD  Performed: anesthesiologist   Performed by: Kenny Antoine MD  Authorized by: Kenny Antoine MD      Indications and Patient Condition  Indications for airway management: anesthesia  Spontaneous Ventilation: absent  Sedation level: deep  Preoxygenated: yes  Patient position: sniffing  Mask difficulty assessment: 1 - vent by mask    Final Airway Details  Final airway type: endotracheal airway      Successful airway: ETT  Cuffed: yes   Successful intubation technique: direct laryngoscopy  Facilitating devices/methods: intubating stylet  Endotracheal tube insertion site: oral  Blade: Demarcus  Blade size: #4  ETT size (mm): 7.5    Cormack-Lehane Classification: grade I - full view of glottis  Placement verified by: chest auscultation and capnometry   Measured from: lips  ETT to lips (cm): 23  Number of attempts at approach: 1  Number of other approaches attempted: 0    Additional Comments  Smooth induction. Eyes taped after induction, prior to intubation. Uncomplicated, successful intubation. Dentition same as previous, atraumatic.

## 2023-05-03 NOTE — ANESTHESIA POSTPROCEDURE EVALUATION
[address] Patient Status:  Hospital Outpatient Surgery   Age/Gender 30year old male MRN [id number]   [facility] SURGERY Attending Porfirio Goff MD   [facility] Day # 0 PCP Hilda Rinne, MD       Anesthesia Post-op Note    ANAL EXAMINATION UNDER ANESTHESIA, ANOSCOPY, COMPLETION FISTULOTOMY    Procedure Summary     Date: 05/03/23 Room / Location: [facility] MAIN OR 03 / [facility] MAIN OR    Anesthesia Start: 1152 Anesthesia Stop: 0269    Procedure: ANAL EXAMINATION UNDER ANESTHESIA, ANOSCOPY, COMPLETION FISTULOTOMY (Anus) Diagnosis:       Anal fistula      (Anal fistula [K60.3])    Surgeons: Porfirio Goff MD Anesthesiologist: July Garsia MD    Anesthesia Type: general ASA Status: 2          Anesthesia Type: general    Vitals Value Taken Time   /65 05/03/23 1255   Temp 97.6 05/03/23 1255   Pulse 61 05/03/23 1255   Resp 16 05/03/23 1255   SpO2 100 05/03/23 1255       Patient Location: PACU    Anesthesia Type: general    Airway Patency: patent    Postop Pain Control: adequate    Mental Status: mildly sedated but able to meaningfully participate in the post-anesthesia evaluation    Nausea/Vomiting: none    Cardiopulmonary/Hydration status: stable euvolemic    Complications: no apparent anesthesia related complications    Postop vital signs: stable    Comments: signout given to pacu RN Thelma    Dental Exam: Unchanged from Preop    Patient to be discharged from PACU when criteria met.

## 2023-11-01 ENCOUNTER — HOSPITAL ENCOUNTER (EMERGENCY)
Facility: HOSPITAL | Age: 38
Discharge: HOME | End: 2023-11-01
Attending: EMERGENCY MEDICINE
Payer: COMMERCIAL

## 2023-11-01 ENCOUNTER — APPOINTMENT (OUTPATIENT)
Dept: CT IMAGING | Facility: HOSPITAL | Age: 38
End: 2023-11-01
Attending: EMERGENCY MEDICINE
Payer: COMMERCIAL

## 2023-11-01 ENCOUNTER — APPOINTMENT (OUTPATIENT)
Dept: ULTRASOUND IMAGING | Facility: HOSPITAL | Age: 38
End: 2023-11-01
Payer: COMMERCIAL

## 2023-11-01 VITALS
OXYGEN SATURATION: 98 % | RESPIRATION RATE: 18 BRPM | WEIGHT: 300 LBS | BODY MASS INDEX: 38.5 KG/M2 | SYSTOLIC BLOOD PRESSURE: 113 MMHG | DIASTOLIC BLOOD PRESSURE: 79 MMHG | TEMPERATURE: 98 F | HEIGHT: 74 IN | HEART RATE: 69 BPM

## 2023-11-01 DIAGNOSIS — I82.4Z2 ACUTE DEEP VEIN THROMBOSIS (DVT) OF DISTAL VEIN OF LEFT LOWER EXTREMITY (HCC): Primary | ICD-10-CM

## 2023-11-01 LAB
ALBUMIN SERPL-MCNC: 3.8 G/DL (ref 3.4–5)
ALBUMIN/GLOB SERPL: 1.1 {RATIO} (ref 1–2)
ALP LIVER SERPL-CCNC: 46 U/L
ALT SERPL-CCNC: 32 U/L
ANION GAP SERPL CALC-SCNC: 6 MMOL/L (ref 0–18)
APTT PPP: 27.5 SECONDS (ref 23.3–35.6)
AST SERPL-CCNC: 17 U/L (ref 15–37)
BASOPHILS # BLD AUTO: 0.02 X10(3) UL (ref 0–0.2)
BASOPHILS NFR BLD AUTO: 0.3 %
BILIRUB SERPL-MCNC: 0.8 MG/DL (ref 0.1–2)
BUN BLD-MCNC: 18 MG/DL (ref 9–23)
CALCIUM BLD-MCNC: 9.2 MG/DL (ref 8.5–10.1)
CHLORIDE SERPL-SCNC: 108 MMOL/L (ref 98–112)
CO2 SERPL-SCNC: 23 MMOL/L (ref 21–32)
CREAT BLD-MCNC: 1.01 MG/DL
EGFRCR SERPLBLD CKD-EPI 2021: 99 ML/MIN/1.73M2 (ref 60–?)
EOSINOPHIL # BLD AUTO: 0.23 X10(3) UL (ref 0–0.7)
EOSINOPHIL NFR BLD AUTO: 3.4 %
ERYTHROCYTE [DISTWIDTH] IN BLOOD BY AUTOMATED COUNT: 11.7 %
GLOBULIN PLAS-MCNC: 3.4 G/DL (ref 2.8–4.4)
GLUCOSE BLD-MCNC: 90 MG/DL (ref 70–99)
HCT VFR BLD AUTO: 41.5 %
HGB BLD-MCNC: 15 G/DL
IMM GRANULOCYTES # BLD AUTO: 0.02 X10(3) UL (ref 0–1)
IMM GRANULOCYTES NFR BLD: 0.3 %
INR BLD: 0.99 (ref 0.8–1.2)
LYMPHOCYTES # BLD AUTO: 2.33 X10(3) UL (ref 1–4)
LYMPHOCYTES NFR BLD AUTO: 34.2 %
MCH RBC QN AUTO: 31.1 PG (ref 26–34)
MCHC RBC AUTO-ENTMCNC: 36.1 G/DL (ref 31–37)
MCV RBC AUTO: 86.1 FL
MONOCYTES # BLD AUTO: 0.49 X10(3) UL (ref 0.1–1)
MONOCYTES NFR BLD AUTO: 7.2 %
NEUTROPHILS # BLD AUTO: 3.72 X10 (3) UL (ref 1.5–7.7)
NEUTROPHILS # BLD AUTO: 3.72 X10(3) UL (ref 1.5–7.7)
NEUTROPHILS NFR BLD AUTO: 54.6 %
OSMOLALITY SERPL CALC.SUM OF ELEC: 285 MOSM/KG (ref 275–295)
PLATELET # BLD AUTO: 166 10(3)UL (ref 150–450)
POTASSIUM SERPL-SCNC: 4.3 MMOL/L (ref 3.5–5.1)
PROT SERPL-MCNC: 7.2 G/DL (ref 6.4–8.2)
PROTHROMBIN TIME: 13.1 SECONDS (ref 11.6–14.8)
RBC # BLD AUTO: 4.82 X10(6)UL
SODIUM SERPL-SCNC: 137 MMOL/L (ref 136–145)
WBC # BLD AUTO: 6.8 X10(3) UL (ref 4–11)

## 2023-11-01 PROCEDURE — 99285 EMERGENCY DEPT VISIT HI MDM: CPT

## 2023-11-01 PROCEDURE — 85610 PROTHROMBIN TIME: CPT | Performed by: EMERGENCY MEDICINE

## 2023-11-01 PROCEDURE — 71275 CT ANGIOGRAPHY CHEST: CPT | Performed by: EMERGENCY MEDICINE

## 2023-11-01 PROCEDURE — 93971 EXTREMITY STUDY: CPT

## 2023-11-01 PROCEDURE — 85730 THROMBOPLASTIN TIME PARTIAL: CPT | Performed by: EMERGENCY MEDICINE

## 2023-11-01 PROCEDURE — 99284 EMERGENCY DEPT VISIT MOD MDM: CPT

## 2023-11-01 PROCEDURE — 80053 COMPREHEN METABOLIC PANEL: CPT | Performed by: EMERGENCY MEDICINE

## 2023-11-01 PROCEDURE — 85025 COMPLETE CBC W/AUTO DIFF WBC: CPT | Performed by: EMERGENCY MEDICINE

## 2023-11-01 PROCEDURE — 36415 COLL VENOUS BLD VENIPUNCTURE: CPT

## 2023-11-01 NOTE — ED QUICK NOTES
Call from radiologist regarding ultrasound for pt. DVT found in the in the left posterior tibial vein. MD Cole made aware.

## 2023-11-01 NOTE — DISCHARGE INSTRUCTIONS
You will need to follow-up with hematologist listed above in 1 week    You will need blood thinners for at least 3 months. Your first 30-day supply was given and further treatment can be directed by hematologist.      Return of chest pain, shortness of breath.   CT scan negative for pulmonary embolism    Have incidental finding of 3 mm left pulmonary nodule which should be followed up with primary physician or hematologist.  This may need repeat imaging in 6 to 12 months

## 2023-11-01 NOTE — ED INITIAL ASSESSMENT (HPI)
Pt ambulatory to triage. Pt c/o left calf pain since Monday, pt states intermittently pain goes to the groin. Pt states hx of DVT.

## 2023-11-01 NOTE — PROGRESS NOTES
Provided education to patient regarding Apixaban (Eliquis). Patient was educated on directions of use, follow-up with provider, and when to return to ED to rule out major bleeding. Patient was able to verbalize understanding and teach back.     Rd Mosley, PharmD  11/01/23 2:35 PM  Clinical Pharmacist Specialist- Emergency Medicine  [facility] [phone]

## 2023-11-01 NOTE — ED QUICK NOTES
Pt denied any questions at time of discharge. Pharmacy was at bedside to discuss blood thinner with patient.

## 2023-11-02 ENCOUNTER — TELEPHONE (OUTPATIENT)
Dept: HEMATOLOGY/ONCOLOGY | Facility: HOSPITAL | Age: 38
End: 2023-11-02

## 2023-11-16 ENCOUNTER — OFFICE VISIT (OUTPATIENT)
Dept: HEMATOLOGY/ONCOLOGY | Facility: HOSPITAL | Age: 38
End: 2023-11-16
Attending: INTERNAL MEDICINE
Payer: COMMERCIAL

## 2023-11-16 VITALS
TEMPERATURE: 98 F | DIASTOLIC BLOOD PRESSURE: 80 MMHG | RESPIRATION RATE: 18 BRPM | SYSTOLIC BLOOD PRESSURE: 126 MMHG | WEIGHT: 315 LBS | OXYGEN SATURATION: 97 % | BODY MASS INDEX: 41 KG/M2 | HEART RATE: 77 BPM

## 2023-11-16 DIAGNOSIS — I82.442 ACUTE DEEP VEIN THROMBOSIS (DVT) OF TIBIAL VEIN OF LEFT LOWER EXTREMITY (HCC): Primary | ICD-10-CM

## 2023-11-16 PROCEDURE — 99205 OFFICE O/P NEW HI 60 MIN: CPT | Performed by: INTERNAL MEDICINE

## 2023-11-16 RX ORDER — DEXTROAMPHETAMINE SACCHARATE, AMPHETAMINE ASPARTATE, DEXTROAMPHETAMINE SULFATE AND AMPHETAMINE SULFATE 5; 5; 5; 5 MG/1; MG/1; MG/1; MG/1
1 TABLET ORAL 3 TIMES DAILY
COMMUNITY
Start: 2023-11-13

## 2023-11-16 NOTE — PROGRESS NOTES
Education Record    Learner:  Patient and Spouse    Disease / Diagnosis: DVT     Barriers / Limitations:  None   Comments:    Method:  Discussion   Comments:    General Topics:  Pain, Side effects and symptom management, and Plan of care reviewed   Comments:    Outcome:  Shows understanding   Comments:    Here for new consult for DVT. He reports his leg feels better and there's no residual swelling/redness. He is on Eliquis with no issues.

## 2023-11-17 LAB
B2 GLYCOPROT1 IGG SERPL IA-ACNC: 1.4 U/ML (ref ?–7)
B2 GLYCOPROT1 IGM SERPL IA-ACNC: <2.4 U/ML (ref ?–7)
CARDIOLIPIN IGG SERPL-ACNC: 2.2 GPL (ref ?–10)
CARDIOLIPIN IGM SERPL-ACNC: 3.7 MPL (ref ?–10)
F2 C.20210G>A GENO BLD/T: NORMAL
F5 P.R506Q BLD/T QL: NORMAL

## 2023-11-18 LAB — B2 GLYCOPROT I IGA AB: <9 GPI IGA UNITS

## 2023-12-29 NOTE — TELEPHONE ENCOUNTER
Spoke with patient regarding lab results. Per MD:  Tests negative - no evidence of underlying clotting disorder - Dr Dipesh Valdez       Patient would like to schedule televisit to further discuss results with MD. Appointment scheduled with patient.

## 2023-12-29 NOTE — TELEPHONE ENCOUNTER
Patient is calling to get test results from blood work. He would like to discuss medications Eliquis vs. Coumadin. Allergies:-  No Known Drug Allergies  eggs  peanuts

## 2024-01-02 ENCOUNTER — VIRTUAL PHONE E/M (OUTPATIENT)
Dept: HEMATOLOGY/ONCOLOGY | Facility: HOSPITAL | Age: 39
End: 2024-01-02
Attending: INTERNAL MEDICINE
Payer: COMMERCIAL

## 2024-01-02 DIAGNOSIS — I82.442 ACUTE DEEP VEIN THROMBOSIS (DVT) OF TIBIAL VEIN OF LEFT LOWER EXTREMITY (HCC): ICD-10-CM

## 2024-01-02 DIAGNOSIS — R91.1 PULMONARY NODULE: Primary | ICD-10-CM

## 2024-01-02 PROCEDURE — 99214 OFFICE O/P EST MOD 30 MIN: CPT | Performed by: INTERNAL MEDICINE

## 2024-07-11 DIAGNOSIS — R07.9 CHEST PAIN, UNSPECIFIED TYPE: Primary | ICD-10-CM

## 2024-07-11 DIAGNOSIS — E78.2 MIXED HYPERLIPIDEMIA: ICD-10-CM

## 2024-07-11 DIAGNOSIS — Z82.49 FAMILY HISTORY OF ISCHEMIC HEART DISEASE: ICD-10-CM

## 2024-07-18 ENCOUNTER — APPOINTMENT (OUTPATIENT)
Dept: CT IMAGING | Facility: HOSPITAL | Age: 39
End: 2024-07-18
Attending: EMERGENCY MEDICINE
Payer: COMMERCIAL

## 2024-07-18 ENCOUNTER — APPOINTMENT (OUTPATIENT)
Dept: ULTRASOUND IMAGING | Facility: HOSPITAL | Age: 39
DRG: 270 | End: 2024-07-18
Attending: EMERGENCY MEDICINE
Payer: COMMERCIAL

## 2024-07-18 ENCOUNTER — APPOINTMENT (OUTPATIENT)
Dept: ULTRASOUND IMAGING | Facility: HOSPITAL | Age: 39
DRG: 270 | End: 2024-07-18
Payer: COMMERCIAL

## 2024-07-18 ENCOUNTER — APPOINTMENT (OUTPATIENT)
Dept: INTERVENTIONAL RADIOLOGY/VASCULAR | Facility: HOSPITAL | Age: 39
DRG: 270 | End: 2024-07-18
Attending: INTERNAL MEDICINE
Payer: COMMERCIAL

## 2024-07-18 ENCOUNTER — APPOINTMENT (OUTPATIENT)
Dept: INTERVENTIONAL RADIOLOGY/VASCULAR | Facility: HOSPITAL | Age: 39
End: 2024-07-18
Attending: INTERNAL MEDICINE
Payer: COMMERCIAL

## 2024-07-18 ENCOUNTER — APPOINTMENT (OUTPATIENT)
Dept: CV DIAGNOSTICS | Facility: HOSPITAL | Age: 39
DRG: 270 | End: 2024-07-18
Attending: INTERNAL MEDICINE
Payer: COMMERCIAL

## 2024-07-18 ENCOUNTER — APPOINTMENT (OUTPATIENT)
Dept: CV DIAGNOSTICS | Facility: HOSPITAL | Age: 39
End: 2024-07-18
Attending: INTERNAL MEDICINE
Payer: COMMERCIAL

## 2024-07-18 ENCOUNTER — HOSPITAL ENCOUNTER (OUTPATIENT)
Facility: HOSPITAL | Age: 39
Setting detail: OBSERVATION
Discharge: HOME | DRG: 270 | End: 2024-07-20
Attending: EMERGENCY MEDICINE | Admitting: HOSPITALIST
Payer: COMMERCIAL

## 2024-07-18 ENCOUNTER — APPOINTMENT (OUTPATIENT)
Dept: ULTRASOUND IMAGING | Facility: HOSPITAL | Age: 39
End: 2024-07-18
Attending: HOSPITALIST
Payer: COMMERCIAL

## 2024-07-18 ENCOUNTER — APPOINTMENT (OUTPATIENT)
Dept: ULTRASOUND IMAGING | Facility: HOSPITAL | Age: 39
End: 2024-07-18
Attending: EMERGENCY MEDICINE
Payer: COMMERCIAL

## 2024-07-18 ENCOUNTER — APPOINTMENT (OUTPATIENT)
Dept: ULTRASOUND IMAGING | Facility: HOSPITAL | Age: 39
DRG: 270 | End: 2024-07-18
Attending: HOSPITALIST
Payer: COMMERCIAL

## 2024-07-18 ENCOUNTER — APPOINTMENT (OUTPATIENT)
Dept: CT IMAGING | Facility: HOSPITAL | Age: 39
DRG: 270 | End: 2024-07-18
Attending: EMERGENCY MEDICINE
Payer: COMMERCIAL

## 2024-07-18 ENCOUNTER — HOSPITAL ENCOUNTER (INPATIENT)
Facility: HOSPITAL | Age: 39
LOS: 1 days | Discharge: HOME | End: 2024-07-20
Attending: EMERGENCY MEDICINE | Admitting: HOSPITALIST
Payer: COMMERCIAL

## 2024-07-18 ENCOUNTER — APPOINTMENT (OUTPATIENT)
Dept: ULTRASOUND IMAGING | Facility: HOSPITAL | Age: 39
End: 2024-07-18
Payer: COMMERCIAL

## 2024-07-18 DIAGNOSIS — I82.4Y1 ACUTE DEEP VEIN THROMBOSIS (DVT) OF PROXIMAL VEIN OF RIGHT LOWER EXTREMITY (HCC): ICD-10-CM

## 2024-07-18 DIAGNOSIS — I26.99 OTHER ACUTE PULMONARY EMBOLISM, UNSPECIFIED WHETHER ACUTE COR PULMONALE PRESENT (HCC): Primary | ICD-10-CM

## 2024-07-18 DIAGNOSIS — I82.409 DVT (DEEP VENOUS THROMBOSIS) (HCC): ICD-10-CM

## 2024-07-18 LAB
ALBUMIN SERPL-MCNC: 4.2 G/DL (ref 3.2–4.8)
ALBUMIN/GLOB SERPL: 1.6 {RATIO} (ref 1–2)
ALP LIVER SERPL-CCNC: 52 U/L
ALT SERPL-CCNC: 22 U/L
ANION GAP SERPL CALC-SCNC: 3 MMOL/L (ref 0–18)
APTT PPP: 28.8 SECONDS (ref 23–36)
APTT PPP: >240 SECONDS (ref 23–36)
APTT PPP: >240 SECONDS (ref 23–36)
AST SERPL-CCNC: 21 U/L (ref ?–34)
ATRIAL RATE: 64 BPM
BASOPHILS # BLD AUTO: 0.02 X10(3) UL (ref 0–0.2)
BASOPHILS NFR BLD AUTO: 0.3 %
BILIRUB SERPL-MCNC: 0.7 MG/DL (ref 0.3–1.2)
BUN BLD-MCNC: 9 MG/DL (ref 9–23)
CALCIUM BLD-MCNC: 9.4 MG/DL (ref 8.7–10.4)
CHLORIDE SERPL-SCNC: 108 MMOL/L (ref 98–112)
CO2 SERPL-SCNC: 28 MMOL/L (ref 21–32)
CREAT BLD-MCNC: 0.98 MG/DL
EGFRCR SERPLBLD CKD-EPI 2021: 102 ML/MIN/1.73M2 (ref 60–?)
EOSINOPHIL # BLD AUTO: 0.25 X10(3) UL (ref 0–0.7)
EOSINOPHIL NFR BLD AUTO: 3.8 %
ERYTHROCYTE [DISTWIDTH] IN BLOOD BY AUTOMATED COUNT: 11.8 %
ERYTHROCYTE [DISTWIDTH] IN BLOOD BY AUTOMATED COUNT: 11.9 %
GLOBULIN PLAS-MCNC: 2.6 G/DL (ref 2.8–4.4)
GLUCOSE BLD-MCNC: 93 MG/DL (ref 70–99)
HCT VFR BLD AUTO: 39.3 %
HCT VFR BLD AUTO: 40 %
HGB BLD-MCNC: 13.8 G/DL
HGB BLD-MCNC: 14.2 G/DL
IMM GRANULOCYTES # BLD AUTO: 0.02 X10(3) UL (ref 0–1)
IMM GRANULOCYTES NFR BLD: 0.3 %
LYMPHOCYTES # BLD AUTO: 1.83 X10(3) UL (ref 1–4)
LYMPHOCYTES NFR BLD AUTO: 27.6 %
MCH RBC QN AUTO: 31.2 PG (ref 26–34)
MCH RBC QN AUTO: 31.3 PG (ref 26–34)
MCHC RBC AUTO-ENTMCNC: 35.1 G/DL (ref 31–37)
MCHC RBC AUTO-ENTMCNC: 35.5 G/DL (ref 31–37)
MCV RBC AUTO: 88.3 FL
MCV RBC AUTO: 88.9 FL
MONOCYTES # BLD AUTO: 0.6 X10(3) UL (ref 0.1–1)
MONOCYTES NFR BLD AUTO: 9.1 %
NEUTROPHILS # BLD AUTO: 3.9 X10 (3) UL (ref 1.5–7.7)
NEUTROPHILS # BLD AUTO: 3.9 X10(3) UL (ref 1.5–7.7)
NEUTROPHILS NFR BLD AUTO: 58.9 %
OSMOLALITY SERPL CALC.SUM OF ELEC: 286 MOSM/KG (ref 275–295)
P AXIS: 14 DEGREES
P-R INTERVAL: 156 MS
PLATELET # BLD AUTO: 149 10(3)UL (ref 150–450)
PLATELET # BLD AUTO: 153 10(3)UL (ref 150–450)
POTASSIUM SERPL-SCNC: 4.3 MMOL/L (ref 3.5–5.1)
PROT SERPL-MCNC: 6.8 G/DL (ref 5.7–8.2)
Q-T INTERVAL: 398 MS
QRS DURATION: 100 MS
QTC CALCULATION (BEZET): 410 MS
R AXIS: 29 DEGREES
RBC # BLD AUTO: 4.42 X10(6)UL
RBC # BLD AUTO: 4.53 X10(6)UL
SARS-COV-2 RNA RESP QL NAA+PROBE: NOT DETECTED
SODIUM SERPL-SCNC: 139 MMOL/L (ref 136–145)
T AXIS: 16 DEGREES
TROPONIN I SERPL HS-MCNC: <3 NG/L
VENTRICULAR RATE: 64 BPM
WBC # BLD AUTO: 6.6 X10(3) UL (ref 4–11)
WBC # BLD AUTO: 7.6 X10(3) UL (ref 4–11)

## 2024-07-18 PROCEDURE — 06CM3ZZ EXTIRPATION OF MATTER FROM RIGHT FEMORAL VEIN, PERCUTANEOUS APPROACH: ICD-10-PCS | Performed by: INTERNAL MEDICINE

## 2024-07-18 PROCEDURE — 93971 EXTREMITY STUDY: CPT | Performed by: EMERGENCY MEDICINE

## 2024-07-18 PROCEDURE — 99223 1ST HOSP IP/OBS HIGH 75: CPT | Performed by: HOSPITALIST

## 2024-07-18 PROCEDURE — B51BYZZ FLUOROSCOPY OF RIGHT LOWER EXTREMITY VEINS USING OTHER CONTRAST: ICD-10-PCS | Performed by: INTERNAL MEDICINE

## 2024-07-18 PROCEDURE — 93306 TTE W/DOPPLER COMPLETE: CPT | Performed by: INTERNAL MEDICINE

## 2024-07-18 PROCEDURE — 71275 CT ANGIOGRAPHY CHEST: CPT | Performed by: EMERGENCY MEDICINE

## 2024-07-18 RX ORDER — DIPHENHYDRAMINE HYDROCHLORIDE 50 MG/ML
INJECTION INTRAMUSCULAR; INTRAVENOUS
Status: COMPLETED
Start: 2024-07-18 | End: 2024-07-18

## 2024-07-18 RX ORDER — SODIUM CHLORIDE 9 MG/ML
INJECTION, SOLUTION INTRAVENOUS CONTINUOUS
Status: DISCONTINUED | OUTPATIENT
Start: 2024-07-18 | End: 2024-07-19

## 2024-07-18 RX ORDER — SODIUM PHOSPHATE, DIBASIC AND SODIUM PHOSPHATE, MONOBASIC 7; 19 G/230ML; G/230ML
1 ENEMA RECTAL ONCE AS NEEDED
Status: DISCONTINUED | OUTPATIENT
Start: 2024-07-18 | End: 2024-07-20

## 2024-07-18 RX ORDER — MONTELUKAST SODIUM 10 MG/1
10 TABLET ORAL DAILY
COMMUNITY

## 2024-07-18 RX ORDER — SODIUM CHLORIDE 9 MG/ML
INJECTION, SOLUTION INTRAVENOUS
Status: ACTIVE | OUTPATIENT
Start: 2024-07-19 | End: 2024-07-19

## 2024-07-18 RX ORDER — CLONAZEPAM 0.5 MG/1
1 TABLET ORAL 3 TIMES DAILY
Status: DISCONTINUED | OUTPATIENT
Start: 2024-07-18 | End: 2024-07-20

## 2024-07-18 RX ORDER — SENNOSIDES 8.6 MG
17.2 TABLET ORAL NIGHTLY PRN
Status: DISCONTINUED | OUTPATIENT
Start: 2024-07-18 | End: 2024-07-20

## 2024-07-18 RX ORDER — DEXTROAMPHETAMINE SACCHARATE, AMPHETAMINE ASPARTATE, DEXTROAMPHETAMINE SULFATE AND AMPHETAMINE SULFATE 2.5; 2.5; 2.5; 2.5 MG/1; MG/1; MG/1; MG/1
20 TABLET ORAL 3 TIMES DAILY
Status: DISCONTINUED | OUTPATIENT
Start: 2024-07-18 | End: 2024-07-20

## 2024-07-18 RX ORDER — HEPARIN SODIUM 1000 [USP'U]/ML
80 INJECTION, SOLUTION INTRAVENOUS; SUBCUTANEOUS ONCE
Status: COMPLETED | OUTPATIENT
Start: 2024-07-18 | End: 2024-07-18

## 2024-07-18 RX ORDER — CLONAZEPAM 2 MG/1
1 TABLET ORAL 3 TIMES DAILY
COMMUNITY
Start: 2024-07-11

## 2024-07-18 RX ORDER — HEPARIN SODIUM AND DEXTROSE 10000; 5 [USP'U]/100ML; G/100ML
INJECTION INTRAVENOUS CONTINUOUS
Status: DISPENSED | OUTPATIENT
Start: 2024-07-18 | End: 2024-07-20

## 2024-07-18 RX ORDER — BISACODYL 10 MG
10 SUPPOSITORY, RECTAL RECTAL
Status: DISCONTINUED | OUTPATIENT
Start: 2024-07-18 | End: 2024-07-20

## 2024-07-18 RX ORDER — HEPARIN SODIUM AND DEXTROSE 10000; 5 [USP'U]/100ML; G/100ML
18 INJECTION INTRAVENOUS ONCE
Status: COMPLETED | OUTPATIENT
Start: 2024-07-18 | End: 2024-07-18

## 2024-07-18 RX ORDER — ACETAMINOPHEN 500 MG
1000 TABLET ORAL EVERY 4 HOURS PRN
Status: DISCONTINUED | OUTPATIENT
Start: 2024-07-18 | End: 2024-07-20

## 2024-07-18 RX ORDER — POLYETHYLENE GLYCOL 3350 17 G/17G
17 POWDER, FOR SOLUTION ORAL DAILY PRN
Status: DISCONTINUED | OUTPATIENT
Start: 2024-07-18 | End: 2024-07-20

## 2024-07-18 RX ORDER — ASPIRIN 81 MG/1
324 TABLET, CHEWABLE ORAL ONCE
Status: DISCONTINUED | OUTPATIENT
Start: 2024-07-18 | End: 2024-07-20

## 2024-07-18 RX ORDER — IODIXANOL 320 MG/ML
100 INJECTION, SOLUTION INTRAVASCULAR
Status: COMPLETED | OUTPATIENT
Start: 2024-07-18 | End: 2024-07-18

## 2024-07-18 RX ORDER — HYDROCODONE BITARTRATE AND ACETAMINOPHEN 5; 325 MG/1; MG/1
1 TABLET ORAL EVERY 4 HOURS PRN
Status: DISCONTINUED | OUTPATIENT
Start: 2024-07-18 | End: 2024-07-20

## 2024-07-18 RX ORDER — HEPARIN SODIUM 5000 [USP'U]/ML
INJECTION, SOLUTION INTRAVENOUS; SUBCUTANEOUS
Status: COMPLETED
Start: 2024-07-18 | End: 2024-07-18

## 2024-07-18 RX ORDER — LIDOCAINE HYDROCHLORIDE 10 MG/ML
INJECTION, SOLUTION EPIDURAL; INFILTRATION; INTRACAUDAL; PERINEURAL
Status: COMPLETED
Start: 2024-07-18 | End: 2024-07-18

## 2024-07-18 RX ORDER — HEPARIN SODIUM AND DEXTROSE 10000; 5 [USP'U]/100ML; G/100ML
INJECTION INTRAVENOUS
Status: COMPLETED
Start: 2024-07-18 | End: 2024-07-18

## 2024-07-18 RX ORDER — MONTELUKAST SODIUM 10 MG/1
10 TABLET ORAL DAILY
Status: DISCONTINUED | OUTPATIENT
Start: 2024-07-18 | End: 2024-07-20

## 2024-07-18 RX ORDER — ALBUTEROL SULFATE 90 UG/1
1 INHALANT RESPIRATORY (INHALATION) EVERY 4 HOURS PRN
Status: DISCONTINUED | OUTPATIENT
Start: 2024-07-18 | End: 2024-07-20

## 2024-07-18 RX ORDER — MELATONIN
3 NIGHTLY PRN
Status: DISCONTINUED | OUTPATIENT
Start: 2024-07-18 | End: 2024-07-20

## 2024-07-18 RX ORDER — MIDAZOLAM HYDROCHLORIDE 1 MG/ML
INJECTION INTRAMUSCULAR; INTRAVENOUS
Status: COMPLETED
Start: 2024-07-18 | End: 2024-07-18

## 2024-07-18 RX ORDER — ONDANSETRON 2 MG/ML
4 INJECTION INTRAMUSCULAR; INTRAVENOUS EVERY 6 HOURS PRN
Status: DISCONTINUED | OUTPATIENT
Start: 2024-07-18 | End: 2024-07-20

## 2024-07-18 RX ORDER — BENZONATATE 200 MG/1
200 CAPSULE ORAL 3 TIMES DAILY PRN
Status: DISCONTINUED | OUTPATIENT
Start: 2024-07-18 | End: 2024-07-20

## 2024-07-18 RX ORDER — PROCHLORPERAZINE EDISYLATE 5 MG/ML
5 INJECTION INTRAMUSCULAR; INTRAVENOUS EVERY 8 HOURS PRN
Status: DISCONTINUED | OUTPATIENT
Start: 2024-07-18 | End: 2024-07-20

## 2024-07-18 RX ORDER — HYDROCODONE BITARTRATE AND ACETAMINOPHEN 5; 325 MG/1; MG/1
2 TABLET ORAL EVERY 4 HOURS PRN
Status: DISCONTINUED | OUTPATIENT
Start: 2024-07-18 | End: 2024-07-20

## 2024-07-18 RX ORDER — ACETAMINOPHEN 325 MG/1
650 TABLET ORAL EVERY 4 HOURS PRN
Status: DISCONTINUED | OUTPATIENT
Start: 2024-07-18 | End: 2024-07-20

## 2024-07-18 NOTE — PROGRESS NOTES
Rc'd pt from cath lab s/p thrombectomy in stable condition. VSS. FlowStasis intact to right popliteal artery. Site is soft, clean and dry. No bleeding or hematoma. Pt denies c/o pain or discomfort. Right foot with palpable pulse and warm to touch. Pt c/o numbness on bottom of right foot that resolved within a half an hour. Pt on heparin drip at 2600u. PT/PTT to be drawn upon arrival to floor. Awaiting inpt bed. Dr Bronson spoke with pts wife. Pt tolerating po intake well.     17:15: No changes. Report called to MARCIE Flores. Pt transported to [facility] in stable condition without c/o. Ultrasound was notified for scan of left leg.

## 2024-07-18 NOTE — ED PROVIDER NOTES
Patient Seen in: [facility] Emergency Department      History     Chief Complaint   Patient presents with    Deep Vein Thrombosis     Stated Complaint: R leg pain for 3 days    Subjective:   HPI    Patient is a 39-year-old male presenting to the ED with right calf pain.  The history is obtained from patient who is a good historian.  The patient states he had similar symptoms about 10 months ago when he was diagnosed with a DVT.  He was put on Eliquis at that time but was taken off of anticoagulation a couple of months ago.  He also reports some shortness of breath that is worse than his baseline today.  He denies any chest pain.  The shortness of breath is constant and present at rest.  No cough or URI symptoms.  No fevers or chills.  No lower extremity edema.  No recent surgery or travel history.  The patient states that he does have a family history of blood clots.  He had some genetic testing performed recently but does not have the test results.  Pain in his right leg started a couple of days ago.  No recent trauma, injury, or strain.  Patient's pain is moderate in severity.    Objective:   Past Medical History:    ADHD    Anxiety    Asthma ([facility])    Attention deficit hyperactivity disorder (ADHD)    Atypical mole    Back pain    COPD (chronic obstructive pulmonary disease) ([facility])    Decorative tattoo    Esophageal reflux    Fatigue    Hearing impairment    NO APPLIANCE    Hearing loss    Heartburn    Hemorrhoids    Lab test positive for detection of COVID-19 virus    11/2021- NO HOSPITALIZATION- ASYMPTOMATIC    Perirectal fistula    Stool incontinence    Vertigo              No pertinent past surgical history.              No pertinent social history.            Review of Systems    Positive for stated Chief Complaint: Deep Vein Thrombosis    Other systems are as noted in HPI.  Constitutional and vital signs reviewed.      All other systems reviewed and negative except as noted above.    Physical Exam      ED Triage Vitals [07/18/24 0356]   /85   Pulse 72   Resp 16   Temp 98.2 °F (36.8 °C)   Temp src    SpO2 99 %   O2 Device None (Room air)       Current Vitals:   Vital Signs  BP: 142/85  Pulse: 72  Resp: 16  Temp: 98.2 °F (36.8 °C)    Oxygen Therapy  SpO2: 99 %  O2 Device: None (Room air)            Physical Exam  Vitals and nursing note reviewed.   Constitutional:       General: He is not in acute distress.     Appearance: Normal appearance. He is not ill-appearing.   HENT:      Head: Normocephalic and atraumatic.      Right Ear: External ear normal.      Left Ear: External ear normal.      Nose: Nose normal.      Mouth/Throat:      Mouth: Mucous membranes are moist.      Pharynx: Oropharynx is clear. No posterior oropharyngeal erythema.   Eyes:      Conjunctiva/sclera: Conjunctivae normal.   Cardiovascular:      Rate and Rhythm: Normal rate and regular rhythm.      Pulses: Normal pulses.   Pulmonary:      Effort: Pulmonary effort is normal. No respiratory distress.      Breath sounds: Normal breath sounds.   Abdominal:      General: Abdomen is flat. Bowel sounds are normal. There is no distension.      Tenderness: There is no abdominal tenderness.   Musculoskeletal:         General: Tenderness present. Normal range of motion.      Right lower leg: No edema.      Left lower leg: No edema.      Comments: Minimal right calf tenderness.  Negative Homans' sign.   Skin:     General: Skin is warm.      Capillary Refill: Capillary refill takes less than 2 seconds.      Findings: No rash.   Neurological:      General: No focal deficit present.      Mental Status: He is alert and oriented to person, place, and time.   Psychiatric:         Mood and Affect: Mood normal.         Behavior: Behavior normal.               ED Course     Labs Reviewed   COMP METABOLIC PANEL (14) - Abnormal; Notable for the following components:       Result Value    Globulin  2.6 (*)     All other components within normal limits   TROPONIN  I HIGH SENSITIVITY - Normal   CBC WITH DIFFERENTIAL WITH PLATELET    Narrative:     The following orders were created for panel order CBC With Differential With Platelet.  Procedure                               Abnormality         Status                     ---------                               -----------         ------                     CBC W/ DIFFERENTIAL[[id number]]                              Final result                 Please view results for these tests on the individual orders.   PTT, ACTIVATED   RAINBOW DRAW LAVENDER   RAINBOW DRAW LIGHT GREEN   RAINBOW DRAW BLUE   RAINBOW DRAW GOLD   CBC W/ DIFFERENTIAL     EKG    Rate, intervals and axes as noted on EKG Report.  Rate: 64  Rhythm: Sinus Rhythm  Reading: Normal                          MDM      History obtained from patient.     Differential diagnosis includes DVT or PE, musculoskeletal pain, muscle spasm, ACS.    Previous records reviewed.    Testing considered and ordered includes EKG, CBC, CMP, troponin, ultrasound of the right lower extremity, CTA rule out PE.    I reviewed all results.  CBC, CMP unremarkable with negative troponin.    I also reviewed the official report which shows   RIGHT LOWER EXTREMITY DUPLEX ULTRASOUND:  IMPRESSION  Evidence of occlusive and nonocclusive filling defects involving the distal SFV, popliteal vein and PTV.      CT ANGIOGRAPHY, CHEST (CPT=71275)    Result Date: 7/18/2024  PROCEDURE:  CT ANGIOGRAPHY, CHEST (CPT=71275)  COMPARISON:  [facility] , CT, CT ANGIOGRAPHY, CHEST (CPT=71275), 11/01/2023, 1:42 PM.  INDICATIONS:  R leg pain for 3 days  TECHNIQUE:  IV contrast-enhanced multislice CT angiography is performed through the pulmonary arterial anatomy. 3D volume renderings are generated.  Dose reduction techniques were used. Dose information is transmitted to the ACR (American College of Radiology) NRDR (National Radiology Data Registry) which includes the Dose Index Registry.  PATIENT STATED HISTORY:(As transcribed by  Technologist)  right leg pain x 3 days, sob   CONTRAST USED:  100 mLcc of Isovue 370  FINDINGS:  VASCULATURE:  Acute pulmonary embolism extending from the right main pulmonary artery into multiple segmental and subsegmental branches throughout the right lung is noted.  No left pulmonary emboli are noted.  Mild right heart strain is noted. THORACIC AORTA:  No aneurysm or visible dissection.  LUNGS:  No visible pulmonary disease.  MEDIASTINUM:  No adenopathy or mass.  WALT:  No mass or adenopathy.  CARDIAC:  No enlargement, pericardial thickening, or significant coronary artery calcification. PLEURA:  No mass or effusion.  CHEST WALL:  No mass or axillary adenopathy.  LIMITED ABDOMEN:  Limited images of the upper abdomen are unremarkable.  BONES:  No bony lesion or fracture.  OTHER:  Negative.             CONCLUSION:  Extensive pulmonary embolism throughout the right lung.  Minimal right heart strain is noted.  Clinical correlation is advised.  This report was communicated by telephone to Dr. Stauffer at the dictation time shown below.   LOCATION:  [place]   Dictated by (CST): Caleb Cm MD on 7/18/2024 at 6:44 AM     Finalized by (CST): Caleb Cm MD on 7/18/2024 at 6:52 AM             Others who assisted in patient's care included notified [place] hospitalist, Dr. Mora, via [facility] of admission.  Message was received.    Discussed hospice.  Requesting cardiology consultation, on page for notification.    Interventions in care included IV heparin initiated in the ED.  Patient remains on cardiac monitor, normal sinus rhythm, continuous pulse oximetry, no hypoxia, vital signs stable.  Discussed all results as well as plan for hospitalization.                                         Medical Decision Making      Disposition and Plan     Clinical Impression:  1. Other acute pulmonary embolism, unspecified whether acute cor pulmonale present (HCC)    2. Acute deep vein thrombosis (DVT) of proximal vein of  right lower extremity (HCC)         Disposition:  There is no disposition on file for this visit.  There is no disposition time on file for this visit.    Follow-up:  No follow-up provider specified.        Medications Prescribed:  Current Discharge Medication List

## 2024-07-18 NOTE — H&P
[facility]     Gregory Bustillo Patient Status:  Emergency    1985 MRN [id number]   Location [facility] EMERGENCY DEPARTMENT Attending No att. providers found   Hosp Day # 0 PCP Israel Pelaez MD     Chief Complaint: sob, leg pain     Subjective:    History of Present Illness:     Gregory Bustillo is a 39 year old male with pMH DVT, asthma, ADHD, COPD, who p/w calf pain and sob. Has had 2 days of R calf pain similar to previous DVT 10 months ago. Has been off of eliquis for several months. No provoking factors. Today developed sob. Just had hypercoag labs ordered this week which are pending. Has multiple family members w/ clotting d/o's.     History/Other:    Past Medical History:  Past Medical History:    ADHD    Anxiety    Asthma (HCC)    Attention deficit hyperactivity disorder (ADHD)    Atypical mole    Back pain    COPD (chronic obstructive pulmonary disease) ([facility])    Decorative tattoo    Esophageal reflux    Fatigue    Hearing impairment    NO APPLIANCE    Hearing loss    Heartburn    Hemorrhoids    Lab test positive for detection of COVID-19 virus    2021- NO HOSPITALIZATION- ASYMPTOMATIC    Perirectal fistula    Stool incontinence    Vertigo     Past Surgical History:   Past Surgical History:   Procedure Laterality Date    Adenoidectomy      Colonoscopy N/A 01/10/2023    Procedure: ENDOSCOPIC ULTRASOUND (EUS), COLONOSCOPY;  Surgeon: Ervin Pittman MD;  Location:  ENDOSCOPY    Other      gastric sleeve 2019    Tonsillectomy        Family History:   Family History   Problem Relation Age of Onset    Heart Attack Father     Mental Disorder Mother     Stroke Mother     Hypertension Mother     Bleeding Disorders Mother     Diabetes Maternal Grandmother     Breast Cancer Maternal Grandmother     Breast Cancer Paternal Grandmother     Colon Cancer Maternal Uncle     Colon Cancer Paternal Uncle      Social History:    reports that he has quit smoking. His  smoking use included cigarettes. He has a 7 pack-year smoking history. He has quit using smokeless tobacco. He reports current alcohol use. He reports current drug use. Drug: Cannabis.     Allergies:   Allergies   Allergen Reactions    Dust Mite Extract Coughing    Other RASH     Steel and nickel plated metals       Medications:    No current facility-administered medications on file prior to encounter.     Current Outpatient Medications on File Prior to Encounter   Medication Sig Dispense Refill    montelukast 10 MG Oral Tab Take 1 tablet (10 mg total) by mouth daily.      clonazePAM 2 MG Oral Tab Take 0.5 tablets (1 mg total) by mouth 3 (three) times daily.      amphetamine-dextroamphetamine 20 MG Oral Tab Take 1 tablet by mouth 3 (three) times daily.      albuterol 108 (90 Base) MCG/ACT Inhalation Aero Soln Inhale 1 puff into the lungs every 4 (four) hours as needed.         Review of Systems:   A comprehensive review of systems was completed.    Pertinent positives and negatives noted in the HPI.    Objective:   Physical Exam:    /89   Pulse 59   Temp 98.2 °F (36.8 °C)   Resp 15   Wt (!) 320 lb (145.2 kg)   SpO2 100%   BMI 41.09 kg/m²   General: No acute distress, Alert  Respiratory: No rhonchi, no wheezes  Cardiovascular: S1, S2. Regular rate and rhythm  Abdomen: Soft, Non-tender, non-distended, positive bowel sounds  Neuro: No new focal deficits  Extremities: No edema      Results:    Labs:      Labs Last 24 Hours:    Recent Labs   Lab 07/18/24  0507   RBC 4.53   HGB 14.2   HCT 40.0   MCV 88.3   MCH 31.3   MCHC 35.5   RDW 11.8   NEPRELIM 3.90   WBC 6.6   .0       Recent Labs   Lab 07/18/24  0507   GLU 93   BUN 9   CREATSERUM 0.98   EGFRCR 102   CA 9.4   ALB 4.2      K 4.3      CO2 28.0   ALKPHO 52   AST 21   ALT 22   BILT 0.7   TP 6.8       Lab Results   Component Value Date    INR 0.99 11/01/2023       Recent Labs   Lab 07/18/24  0507   TROPHS <3       No results for input(s):  \"TROP\", \"PBNP\" in the last 168 hours.    No results for input(s): \"PCT\" in the last 168 hours.    Imaging: Imaging data reviewed in Epic.    Assessment & Plan:      #acute RLE DVT - extensive  #acute PE w/ R heart strain on CT  -cards for thrombectomy today  -ECHO neg for heart strain   -heme consult  -outpt hypercoag labs neg  -likely life long AC    #LLE pain  -chronic from prev DVT?  -d/w cards. Will try to get stat doppler of LLE prior to thrombectomy. If unable to, can do afterwards.     #Asthma/COPD  #ADHD        Plan of care discussed with pt, ED physician     Gerson Arriaza MD    Supplementary Documentation:     The 21st Century Cures Act makes medical notes like these available to patients in the interest of transparency. Please be advised this is a medical document. Medical documents are intended to carry relevant information, facts as evident, and the clinical opinion of the practitioner. The medical note is intended as peer to peer communication and may appear blunt or direct. It is written in medical language and may contain abbreviations or verbiage that are unfamiliar.

## 2024-07-18 NOTE — PROGRESS NOTES
Rec'd pt from IVS at ~1730. Admission medication list completed. Rt popliteal access site w/ flowstasis in place, site is soft. A&O x 4. Tele shows NSR. O2 sats adequate on RA. Pt continent. C/O RLE pain, prn norco given. Bed locked and in low position, call light and personal items within reach. Will continue to monitor. POC - IVS recovery, IVF 0.9 @ 50cc/hr, heparin gtt, pain control.

## 2024-07-18 NOTE — ED INITIAL ASSESSMENT (HPI)
Pt arrives to ed w c/o possible blood clot. Pt reports hot right calf w pain that woke him up from his sleep. +sob. Hx of blood clot 10 months ago.

## 2024-07-18 NOTE — OPERATIVE REPORT
Full note dictated,    S/P Thrombectomy of right femoral vein via right pop vein    No acute complications    : Aiyana Wallis MD

## 2024-07-18 NOTE — CONSULTS
[facility]  Cardiology Consultation    Gregory Bustillo Patient Status:  Emergency    1985 MRN [id number]   Location [facility] EMERGENCY DEPARTMENT Attending No att. providers found   Hosp Day # 0 PCP Israel Pelaez MD     Reason for Consultation:  Pulmonary embolism    History of Present Illness:  Gregory Bustillo is a a(n) 39 year old male with history of DVT, strong family history of thromboembolic disease, asthma, presents to the hospital with complaints of right lower extremity pain.  He is also complained of right lower extremity swelling.  He states over the last few days he has been noticing some cramping in his right lower extremity.  He tried to roll it out with no success.  This morning it woke him up at 2:00 in the morning and he decided come in.  When walking from the parking lot into the emergency room door he noted that he was also somewhat short of breath.  He currently is not requiring any oxygen.  His troponin is negative.  No evidence of any EKG changes.  Patient was diagnosed with a DVT last fall and was placed on anticoagulation.  He had extensive hematology workup that so far negative.  He was recently taken off anticoagulation a few months ago.    History:  Past Medical History:    ADHD    Anxiety    Asthma (HCC)    Attention deficit hyperactivity disorder (ADHD)    Atypical mole    Back pain    COPD (chronic obstructive pulmonary disease) (HCC)    Decorative tattoo    Esophageal reflux    Fatigue    Hearing impairment    NO APPLIANCE    Hearing loss    Heartburn    Hemorrhoids    Lab test positive for detection of COVID-19 virus    2021- NO HOSPITALIZATION- ASYMPTOMATIC    Perirectal fistula    Stool incontinence    Vertigo     Past Surgical History:   Procedure Laterality Date    Adenoidectomy      Colonoscopy N/A 01/10/2023    Procedure: ENDOSCOPIC ULTRASOUND (EUS), COLONOSCOPY;  Surgeon: Ervin Pittman MD;  Location:  ENDOSCOPY    Other      gastric  sleeve 2019    Tonsillectomy       Family History   Problem Relation Age of Onset    Heart Attack Father     Mental Disorder Mother     Stroke Mother     Hypertension Mother     Bleeding Disorders Mother     Diabetes Maternal Grandmother     Breast Cancer Maternal Grandmother     Breast Cancer Paternal Grandmother     Colon Cancer Maternal Uncle     Colon Cancer Paternal Uncle       reports that he has quit smoking. His smoking use included cigarettes. He has a 7 pack-year smoking history. He has quit using smokeless tobacco. He reports current alcohol use. He reports current drug use. Drug: Cannabis.    Allergies:  Allergies   Allergen Reactions    Dust Mite Extract Coughing    Other RASH     Steel and nickel plated metals       Medications:    Current Facility-Administered Medications:     heparin (Porcine) 88639 units/250 mL infusion ED (PE/DVT/THROMBUS) INITIAL DOSE, 18 Units/kg/hr, Intravenous, Once    heparin (Porcine) 34428 units/250mL infusion PE/DVT/THROMBUS CONTINUOUS, 200-3,000 Units/hr, Intravenous, Continuous    Review of Systems:  A comprehensive review of systems was negative if not otherwise mention in above HPI.    /89   Pulse 59   Temp 98.2 °F (36.8 °C)   Resp 15   Wt (!) 320 lb (145.2 kg)   SpO2 100%   BMI 41.09 kg/m²   Temp (24hrs), Av.2 °F (36.8 °C), Min:98.2 °F (36.8 °C), Max:98.2 °F (36.8 °C)     No intake or output data in the 24 hours ending 24 0846  Wt Readings from Last 3 Encounters:   24 (!) 320 lb (145.2 kg)   23 (!) 322 lb 9.6 oz (146.3 kg)   23 300 lb (136.1 kg)       Physical Exam:   General: Alert and oriented x 3. No apparent distress. No respiratory or constitutional distress.  HEENT: Normocephalic, anicteric sclera, neck supple.  Neck: No JVD, carotids 2+, no bruits.  Cardiac: Regular rate and rhythm. S1, S2 normal. No murmur, pericardial rub, S3.  Lungs: Clear without wheezes, rales, rhonchi or dullness.  Normal excursions and  effort.  Abdomen: Soft, non-tender. BS-present.  Extremities: Without clubbing, cyanosis. 1+ edema  Peripheral pulses are 2+.  Neurologic: Alert and oriented, normal affect.  Skin: Warm and dry.     Laboratory Data:  Lab Results   Component Value Date    WBC 6.6 07/18/2024    HGB 14.2 07/18/2024    HCT 40.0 07/18/2024    .0 07/18/2024    CREATSERUM 0.98 07/18/2024    BUN 9 07/18/2024     07/18/2024    K 4.3 07/18/2024     07/18/2024    CO2 28.0 07/18/2024    GLU 93 07/18/2024    CA 9.4 07/18/2024    ALB 4.2 07/18/2024    ALKPHO 52 07/18/2024    BILT 0.7 07/18/2024    TP 6.8 07/18/2024    AST 21 07/18/2024    ALT 22 07/18/2024    PTT 28.8 07/18/2024       Imaging:  CT PE:  Acute pulmonary embolism extending from the right main pulmonary artery into multiple segmental and subsegmental branches throughout the right lung is noted.  No left pulmonary emboli are noted.  Mild right heart strain is noted.   Impression:  Principal Problem:    Other acute pulmonary embolism, unspecified whether acute cor pulmonale present (HCC)  RLE  Acute DVT extending from posterior tibial vein to the proximal SFV    Recommendations:  -Obtain echocardiogram to assess right heart hemodynamic  -continue IV heparin  -if no evidence of RH strain by echo, will proceed with RLE mechanical thrombectomy  -will need lifelong anticoagulation  -recommend Hem/Onc eval      Thank you for allowing me to participate in the care of your patient.    Rachael Bronson MD  7/18/2024  8:46 AM

## 2024-07-19 ENCOUNTER — APPOINTMENT (OUTPATIENT)
Dept: ULTRASOUND IMAGING | Facility: HOSPITAL | Age: 39
DRG: 270 | End: 2024-07-19
Attending: INTERNAL MEDICINE
Payer: COMMERCIAL

## 2024-07-19 ENCOUNTER — APPOINTMENT (OUTPATIENT)
Dept: ULTRASOUND IMAGING | Facility: HOSPITAL | Age: 39
End: 2024-07-19
Attending: INTERNAL MEDICINE
Payer: COMMERCIAL

## 2024-07-19 LAB
ALBUMIN SERPL-MCNC: 4 G/DL (ref 3.2–4.8)
ALBUMIN/GLOB SERPL: 1.6 {RATIO} (ref 1–2)
ALP LIVER SERPL-CCNC: 54 U/L
ALT SERPL-CCNC: 18 U/L
ANION GAP SERPL CALC-SCNC: 4 MMOL/L (ref 0–18)
ANION GAP SERPL CALC-SCNC: 4 MMOL/L (ref 0–18)
APTT PPP: 95.9 SECONDS (ref 23–36)
AST SERPL-CCNC: 15 U/L (ref ?–34)
BASOPHILS # BLD AUTO: 0.02 X10(3) UL (ref 0–0.2)
BASOPHILS NFR BLD AUTO: 0.3 %
BILIRUB SERPL-MCNC: 0.9 MG/DL (ref 0.3–1.2)
BUN BLD-MCNC: 11 MG/DL (ref 9–23)
BUN BLD-MCNC: 12 MG/DL (ref 9–23)
CALCIUM BLD-MCNC: 9 MG/DL (ref 8.7–10.4)
CALCIUM BLD-MCNC: 9.4 MG/DL (ref 8.7–10.4)
CHLORIDE SERPL-SCNC: 104 MMOL/L (ref 98–112)
CHLORIDE SERPL-SCNC: 106 MMOL/L (ref 98–112)
CO2 SERPL-SCNC: 27 MMOL/L (ref 21–32)
CO2 SERPL-SCNC: 28 MMOL/L (ref 21–32)
CREAT BLD-MCNC: 0.82 MG/DL
CREAT BLD-MCNC: 0.95 MG/DL
EGFRCR SERPLBLD CKD-EPI 2021: 106 ML/MIN/1.73M2 (ref 60–?)
EGFRCR SERPLBLD CKD-EPI 2021: 116 ML/MIN/1.73M2 (ref 60–?)
EOSINOPHIL # BLD AUTO: 0.23 X10(3) UL (ref 0–0.7)
EOSINOPHIL NFR BLD AUTO: 3.7 %
ERYTHROCYTE [DISTWIDTH] IN BLOOD BY AUTOMATED COUNT: 11.9 %
GLOBULIN PLAS-MCNC: 2.5 G/DL (ref 2.8–4.4)
GLUCOSE BLD-MCNC: 83 MG/DL (ref 70–99)
GLUCOSE BLD-MCNC: 94 MG/DL (ref 70–99)
HCT VFR BLD AUTO: 40.1 %
HGB BLD-MCNC: 14.1 G/DL
IMM GRANULOCYTES # BLD AUTO: 0.02 X10(3) UL (ref 0–1)
IMM GRANULOCYTES NFR BLD: 0.3 %
ISTAT ACTIVATED CLOTTING TIME: 262 SECONDS (ref 74–137)
LYMPHOCYTES # BLD AUTO: 2.37 X10(3) UL (ref 1–4)
LYMPHOCYTES NFR BLD AUTO: 38.2 %
MCH RBC QN AUTO: 31.7 PG (ref 26–34)
MCHC RBC AUTO-ENTMCNC: 35.2 G/DL (ref 31–37)
MCV RBC AUTO: 90.1 FL
MONOCYTES # BLD AUTO: 0.38 X10(3) UL (ref 0.1–1)
MONOCYTES NFR BLD AUTO: 6.1 %
NEUTROPHILS # BLD AUTO: 3.18 X10 (3) UL (ref 1.5–7.7)
NEUTROPHILS # BLD AUTO: 3.18 X10(3) UL (ref 1.5–7.7)
NEUTROPHILS NFR BLD AUTO: 51.4 %
OSMOLALITY SERPL CALC.SUM OF ELEC: 281 MOSM/KG (ref 275–295)
OSMOLALITY SERPL CALC.SUM OF ELEC: 284 MOSM/KG (ref 275–295)
PLATELET # BLD AUTO: 146 10(3)UL (ref 150–450)
PLATELET # BLD AUTO: 146 10(3)UL (ref 150–450)
POTASSIUM SERPL-SCNC: 3.7 MMOL/L (ref 3.5–5.1)
POTASSIUM SERPL-SCNC: 4.2 MMOL/L (ref 3.5–5.1)
PROT SERPL-MCNC: 6.5 G/DL (ref 5.7–8.2)
RBC # BLD AUTO: 4.45 X10(6)UL
SODIUM SERPL-SCNC: 136 MMOL/L (ref 136–145)
SODIUM SERPL-SCNC: 137 MMOL/L (ref 136–145)
WBC # BLD AUTO: 6.2 X10(3) UL (ref 4–11)

## 2024-07-19 PROCEDURE — 99233 SBSQ HOSP IP/OBS HIGH 50: CPT | Performed by: HOSPITALIST

## 2024-07-19 PROCEDURE — 99223 1ST HOSP IP/OBS HIGH 75: CPT | Performed by: INTERNAL MEDICINE

## 2024-07-19 PROCEDURE — 93971 EXTREMITY STUDY: CPT | Performed by: INTERNAL MEDICINE

## 2024-07-19 NOTE — CONSULTS
Hematology/Oncology Initial Consultation Note    Patient Name: Gregory Bustillo  Medical Record Number: [id number]    YOB: 1985   Date of Consultation: 7/19/2024   Physician requesting consultation: Dr Arriaza    Reason for Consultation:  Gregory Bustillo was seen today for the diagnosis of recurrent DVT/PE    History of Present Illness:      40 y/o M PMH LLE DVT who is admitted for recurrent DVT and PE.    - reports he was taking care of his brother who just had a MI a few days ago in this hospital  - when we last talked in 01/2024 he was supposed to de-escalate to xarelto 10mg once daily after completing 6 months of therapeutic anticoagulation and complete his CT and US but he did not do this. He said he saw a primary care doctor who took him off xarelto and repeated hypercoagulable testing of which we do not have the results  - several days ago had right leg numbness and kept brushing it off but had progressive swelling and cramping and came to ED. Also noted SOB  - RLE with DVT in mid right posterior tibial vein with partially occlusive thrombus extending from distal right superficial femoral vein into proximal right posterior tibial vein  - CTA with extensive PE throughout right lung  - echo without R heart strain  - had a thrombectomy of R femoral vein 7/18  - now on heparin gtt, reports R leg feels better    Past Medical History:  Past Medical History:    ADHD    Anxiety    Asthma (HCC)    Attention deficit hyperactivity disorder (ADHD)    Atypical mole    Back pain    COPD (chronic obstructive pulmonary disease) ([facility])    Decorative tattoo    Esophageal reflux    Fatigue    Hearing impairment    NO APPLIANCE    Hearing loss    Heartburn    Hemorrhoids    Lab test positive for detection of COVID-19 virus    11/2021- NO HOSPITALIZATION- ASYMPTOMATIC    Perirectal fistula    Stool incontinence    Vertigo       Past Surgical History:   Procedure Laterality Date    Adenoidectomy       Colonoscopy N/A 01/10/2023    Procedure: ENDOSCOPIC ULTRASOUND (EUS), COLONOSCOPY;  Surgeon: Ervin Pittman MD;  Location:  ENDOSCOPY    Other      gastric sleeve 2019    Tonsillectomy         Home Medications:  No current facility-administered medications on file prior to encounter.     Current Outpatient Medications on File Prior to Encounter   Medication Sig Dispense Refill    montelukast 10 MG Oral Tab Take 1 tablet (10 mg total) by mouth daily.      clonazePAM 2 MG Oral Tab Take 0.5 tablets (1 mg total) by mouth 3 (three) times daily.      amphetamine-dextroamphetamine 20 MG Oral Tab Take 1 tablet by mouth 3 (three) times daily.      albuterol 108 (90 Base) MCG/ACT Inhalation Aero Soln Inhale 1 puff into the lungs every 4 (four) hours as needed.         Current Inpatient Medications:  Inpatient Meds:   amphetamine-dextroamphetamine  20 mg Oral TID    montelukast  10 mg Oral Daily    clonazePAM  1 mg Oral TID    sodium chloride   Intravenous On Call    aspirin  324 mg Oral Once      continuous dose heparin 2,300 Units/hr (07/19/24 0333)    sodium chloride 50 mL/hr at 07/18/24 1757       PRN Meds:    albuterol    acetaminophen    melatonin    polyethylene glycol (PEG 3350)    sennosides    bisacodyl    fleet enema    benzonatate    acetaminophen **OR** HYDROcodone-acetaminophen **OR** HYDROcodone-acetaminophen    ondansetron    prochlorperazine    Allergies:   Allergies   Allergen Reactions    Dust Mite Extract Coughing    Other RASH     Steel and nickel plated metals       Psychosocial History:  Social History     Social History Narrative    Not on file     Social History     Socioeconomic History    Marital status:    Tobacco Use    Smoking status: Former     Current packs/day: 1.00     Average packs/day: 1 pack/day for 7.0 years (7.0 ttl pk-yrs)     Types: Cigarettes    Smokeless tobacco: Former   Vaping Use    Vaping status: Former   Substance and Sexual Activity    Alcohol use: Yes    Drug use:  Yes     Types: Cannabis     Social Determinants of Health     Food Insecurity: No Food Insecurity (7/18/2024)    Food Insecurity     Food Insecurity: Never true   Transportation Needs: No Transportation Needs (7/18/2024)    Transportation Needs     Lack of Transportation: No   Housing Stability: Low Risk  (7/18/2024)    Housing Stability     Housing Instability: No       Family Medical History:  Family History   Problem Relation Age of Onset    Heart Attack Father     Mental Disorder Mother     Stroke Mother     Hypertension Mother     Bleeding Disorders Mother     Diabetes Maternal Grandmother     Breast Cancer Maternal Grandmother     Breast Cancer Paternal Grandmother     Colon Cancer Maternal Uncle     Colon Cancer Paternal Uncle        Review of Systems:  A 10-point ROS was done with pertinent positives and negative per the HPI    Vital Signs:  Height: --  Weight: 145.2 kg (320 lb) (07/18 1801)  BSA (Calculated - sq m): --  Pulse: 58 (07/19 0342)  BP: 103/67 (07/19 0342)  Temp: 97.9 °F (36.6 °C) (07/19 0342)  Do Not Use - Resp Rate: --  SpO2: 98 % (07/19 0342)      Wt Readings from Last 6 Encounters:   07/18/24 (!) 145.2 kg (320 lb)   11/16/23 (!) 146.3 kg (322 lb 9.6 oz)   11/01/23 136.1 kg (300 lb)   05/03/23 136.1 kg (300 lb)   03/17/23 (!) 139.7 kg (308 lb)   03/02/23 (!) 140.2 kg (309 lb)   Physical Examination:  General: Patient is alert and oriented, not in acute distress  Psych: Mood and affect are appropriate  Eyes: EOMI, PERRL  ENT: Oropharynx is clear, no adenopathy  CV: no LE edema  Respiratory: Non-labored respirations  GI/Abd: Soft, non-tender  Neurological: Grossly intact   Skin: no rashes or petechiae    Laboratory:  Recent Labs   Lab 07/18/24  0507 07/18/24  1754 07/19/24  0647   WBC 6.6 7.6 6.2   HGB 14.2 13.8 14.1   HCT 40.0 39.3 40.1   .0 149.0* 146.0*  146.0*   MCV 88.3 88.9 90.1   RDW 11.8 11.9 11.9   NEPRELIM 3.90  --  3.18       Recent Labs   Lab 07/18/24  0507 07/18/24  6392  07/19/24  0647    137 136   K 4.3 3.7 4.2    106 104   CO2 28.0 27.0 28.0   BUN 9 12 11   CREATSERUM 0.98 0.95 0.82   GLU 93 94 83   CA 9.4 9.4 9.0   TP 6.8  --  6.5   ALB 4.2  --  4.0   ALKPHO 52  --  54   AST 21  --  15   ALT 22  --  18   BILT 0.7  --  0.9       Recent Labs   Lab 07/18/24  0507 07/18/24  1321 07/18/24  1754 07/19/24  0119   PTT 28.8 >240.0* >240.0* 95.9*       Impression & Plan:     RLE DVT  PE  - given recurrence, unprovoked x2, recommended lifelong anticoagulation  - had prior hypercoagulable testing - FVL, prothrombin gene mutation testing, as well as anti-cardiolipin and beta-glycoprotein antibody testing which were all negative  - send lupus anticoagulant testing as this could not be done before when he was on a DOAC  - check LLE doppler for interval assessment of DVT and new baseline  - continue heparin gtt for now; plan transition tomorrow to apixaban 10mg BID x 7 days followed by apixaban 5mg BID; rx sent    Dispo: likely tomorrow when transitioned to apixaban    Teodoro Werner MD  Hematology/Medical Oncology  [facility]

## 2024-07-19 NOTE — PROCEDURES
[facility]    PATIENT'S NAME: YASMIN GARCIA   ATTENDING PHYSICIAN: Matias Contreras D.O.   OPERATING PHYSICIAN: Chapin Wallis M.D.   PATIENT ACCOUNT#:   [id number]    LOCATION:  [address] [place] [address]  MEDICAL RECORD #:   [id number]       YOB: 1985  ADMISSION DATE:       07/18/2024      OPERATION DATE:  07/18/2024    CARDIAC PROCEDURE TRANSCRIPTION    THROMBECTOMY    PREOPERATIVE DIAGNOSIS:    1.   Symptomatic right femoral deep vein thrombosis.  2.   History of hypercoagulable state with previous deep vein thromboses.  3.   Asthma.   POSTOPERATIVE DIAGNOSIS:    PROCEDURE PERFORMED:  Thrombectomy.    :  Rachael Bronson MD.      CO-:  Chapin Wallis MD.    DESCRIPTION OF PROCEDURE:  After detailed informed consent was obtained, patient was prepped and draped in the usual sterile fashion in the prone position in the cardiac catheterization lab.  Using ultrasound, Dr. Bronson accessed the right popliteal vein with placement ultimately of an 8-Malawian sheath.  A Lowry Advantage wire was placed proximally in the right subclavian vein.  An 8-Malawian intravascular ultrasound interrogation was performed from the vena cava down to the femoral segment.  Patient's vena cava appeared underfilled.  There appeared to be compression of the right common iliac vein suggestive of some May-Thurner physiology.  The rest of the iliacs were widely patent.  Thrombus was noted in the femoral vein.    Angiography confirmed the same.  Next, the 8-Malawian sheath was exchanged for a 16-Malawian ClotTriever sheath.  ClotTriever device was used with 3 passes.  A fair amount of thrombus was extracted.  Subsequent angiography revealed complete resolution of the thrombotic segment in the femoral vein.  Good flow was noted into the iliac veins.  Procedure was terminated.  A flow stasis device was placed over the right popliteal with achievement of good hemostasis.  There were no apparent acute complications noted,  and patient tolerated the procedure well.    SUMMARY:  The patient today underwent thrombectomy of the right femoral vein using a ClotTriever device.  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  This may need to be addressed eventually as an outpatient.     Dictated By Chapin Wallis M.D.  d: 07/18/2024 14:41:31  t: 07/18/2024 17:59:25  [facility] [id number]  AR/

## 2024-07-19 NOTE — PROGRESS NOTES
[facility]   part of [facility]     Hospitalist Progress Note     Gregory Bustillo Patient Status:  Inpatient    1985 MRN [id number]   Location [facility] 8NE-A Attending Matias Contreras*   Hosp Day # 0 PCP Israel Pelaez MD     Chief Complaint: sob    Subjective:     Patient improving    Objective:    Review of Systems:   A comprehensive review of systems was completed; pertinent positive and negatives stated in subjective.    Vital signs:  Temp:  [97.7 °F (36.5 °C)-98.4 °F (36.9 °C)] 97.7 °F (36.5 °C)  Pulse:  [58-79] 61  Resp:  [17-22] 18  BP: (103-121)/(65-78) 116/66  SpO2:  [94 %-99 %] 95 %    Physical Exam:    General: No acute distress  Respiratory: No wheezes, no rhonchi  Cardiovascular: S1, S2, regular rate and rhythm  Abdomen: Soft, Non-tender, non-distended, positive bowel sounds  Neuro: No new focal deficits.   Extremities: bl le edema    Diagnostic Data:    Labs:  Recent Labs   Lab 24  0507 24  1754 24  0647   WBC 6.6 7.6 6.2   HGB 14.2 13.8 14.1   MCV 88.3 88.9 90.1   .0 149.0* 146.0*  146.0*       Recent Labs   Lab 24  0507 24  1754 24  0647   GLU 93 94 83   BUN 9 12 11   CREATSERUM 0.98 0.95 0.82   CA 9.4 9.4 9.0   ALB 4.2  --  4.0    137 136   K 4.3 3.7 4.2    106 104   CO2 28.0 27.0 28.0   ALKPHO 52  --  54   AST 21  --  15   ALT 22  --  18   BILT 0.7  --  0.9   TP 6.8  --  6.5       Estimated Creatinine Clearance: 143.4 mL/min (based on SCr of 0.82 mg/dL).    Recent Labs   Lab 24  0507   TROPHS <3       No results for input(s): \"PTP\", \"INR\" in the last 168 hours.               Microbiology    No results found for this visit on 24.      Imaging: Reviewed in Epic.    Medications:    [START ON 2024] apixaban  10 mg Oral BID    amphetamine-dextroamphetamine  20 mg Oral TID    montelukast  10 mg Oral Daily    clonazePAM  1 mg Oral TID    aspirin  324 mg Oral Once       Assessment & Plan:     #acute RLE DVT - extensive  #acute PE w/ R heart strain on CT  -cards - d/w cards. S/p thrombectomy   -ECHO neg for heart strain   -heme consult  -outpt hypercoag labs neg  -life long AC  -hep gtt, transition to doac      #LLE pain  -chronic from prev DVT?  -LLE doppler     #Asthma/COPD  #ADHD    DISPO: DC tomorrow    Gerson Arriaza MD    Supplementary Documentation:     Quality:  DVT Mechanical Prophylaxis:   SCDs,    DVT Pharmacologic Prophylaxis   Medication    [START ON 7/20/2024] apixaban (Eliquis) tab 10 mg    heparin (Porcine) 28408 units/250mL infusion PE/DVT/THROMBUS CONTINUOUS      DVT Pharmacologic prophylaxis: Aspirin 162 mg         Code Status: Not on file  Saavedra: No urinary catheter in place  Saavedra Duration (in days):   Central line:    BLAZE: 7/20/2024    Discharge is dependent on: course  At this point Mr. Bustillo is expected to be discharge to: home    The 21st Century Cures Act makes medical notes like these available to patients in the interest of transparency. Please be advised this is a medical document. Medical documents are intended to carry relevant information, facts as evident, and the clinical opinion of the practitioner. The medical note is intended as peer to peer communication and may appear blunt or direct. It is written in medical language and may contain abbreviations or verbiage that are unfamiliar.

## 2024-07-19 NOTE — PROGRESS NOTES
Progress Note  Gregory Bustillo Patient Status:  Inpatient    1985 MRN [id number]   Location [facility] 8NE-A Attending Matias Contreras*   Hosp Day # 1 PCP Israel Pelaez MD     Subjective:  He is feeling good. Denies shortness of breath. R leg is feeling better without discomfort today.    Objective:  /67 (BP Location: Right arm)   Pulse 70   Temp 98.4 °F (36.9 °C) (Oral)   Resp 18   Wt (!) 320 lb (145.2 kg)   SpO2 95%   BMI 41.09 kg/m²     Intake/Output:    Intake/Output Summary (Last 24 hours) at 2024 1018  Last data filed at 2024 0336  Gross per 24 hour   Intake 740 ml   Output 1000 ml   Net -260 ml       Last 3 Weights   24 1801 (!) 320 lb (145.2 kg)   24 0356 (!) 320 lb (145.2 kg)   23 1129 (!) 322 lb 9.6 oz (146.3 kg)   23 1139 300 lb (136.1 kg)       Labs:  Recent Labs   Lab 24  0507 24  1754 24  0647   GLU 93 94 83   BUN 9 12 11   CREATSERUM 0.98 0.95 0.82   EGFRCR 102 106 116   CA 9.4 9.4 9.0    137 136   K 4.3 3.7 4.2    106 104   CO2 28.0 27.0 28.0     Recent Labs   Lab 24  0507 24  1754 24  0647   RBC 4.53 4.42 4.45   HGB 14.2 13.8 14.1   HCT 40.0 39.3 40.1   MCV 88.3 88.9 90.1   MCH 31.3 31.2 31.7   MCHC 35.5 35.1 35.2   RDW 11.8 11.9 11.9   NEPRELIM 3.90  --  3.18   WBC 6.6 7.6 6.2   .0 149.0* 146.0*  146.0*         Recent Labs   Lab 24  0507   TROPHS <3       Diagnostics:   Telemetry: NSR, sinus bradycardia    Review of Systems   Cardiovascular:  Negative for chest pain.   Respiratory:  Negative for shortness of breath.        Physical Exam:  General: Alert and oriented in no apparent distress.  HEENT: Pupils equal. Mucous membranes moist.   Neck: No JVD  Cardiac:  Normal S1 S2, Regular. No murmur  Lungs: Clear without wheezes or crackles    Abdomen: Soft, non-tender, ND  Extremities: Without clubbing, cyanosis or edema. RLE popliteal access site without  bleeding/hematoma.    Neurologic: No focal deficits. Normal affect.  Skin: Warm and dry,    Medications:   [START ON 7/20/2024] apixaban  10 mg Oral BID    amphetamine-dextroamphetamine  20 mg Oral TID    montelukast  10 mg Oral Daily    clonazePAM  1 mg Oral TID    sodium chloride   Intravenous On Call    aspirin  324 mg Oral Once      continuous dose heparin 2,300 Units/hr (07/19/24 0333)       Assessment:    Unprovoked RLE DVT s/p thrombectomy of right femoral vein on 7/18, history of prior DVT  DVT occurred while off DOAC. Unprovoked.   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 Negative troponin. Hemodynamically stable without hypoxia  Anticoagulation as above    Plan:    Recovering well post thrombectomy of right femoral vein yesterday. Hematology managing anticoagulation. Plan for heparin gtt and PO Eliquis tomorrow. Possible discharge tomorrow.     Plan of care discussed with patient, RN.    MARIA FERNANDA Hanson  7/19/2024  10:18 AM      Patient seen and examined independently.  Note reviewed and labs reviewed. Agree with above assessment and plan.    RLE DVT s/p mechanical thrombectomy    Patient feeling better today with minimal pain.  Able to ambulate without discomfort and swelling has decreased.  LLE duplex negative for DVT.  Plan of care discussed with Hem/Onc, work up in progress. OK from cardiology standpoint to DC on oral anticoagulation, defer to Hem/Onc  on treatment plan.     Rachael Bronson MD  Cardiologist  [facility]  7/19/2024 12:23 PM      Note to the patient: The 21st Century Cures Act makes medical notes like these available to patients in the interest of transparency. However, be advised that this is a medical document. It is intended as peer to peer communication. It is written in medical language and may contain abbreviations or verbiage that are unfamiliar. It may appear blunt or direct. Medical documents are intended to carry relevant information, facts as evident, and clinical opinion of the practitioner.     Disclaimer: Components of this note were documented using voice recognition system and are subject to errors not corrected at proofreading. Contact the author of this note for any clarifications.

## 2024-07-19 NOTE — PLAN OF CARE
Assumed care of patient at 0700; alert and oriented. Bilateral lung sounds clear; on room air. NSR on monitor. Patient denies chest pain, shortness of breath, dizziness. No issues while ambulating; weakness in left leg. Active bowel sounds; continent of bowel and bladder. Patient updated on plan of care for heparin and US RLE; questions answered. Bed in lowest position and call light within reach.     -----------------------------------------------------------

## 2024-07-19 NOTE — RESPIRATORY THERAPY NOTE
Patient has evaluated for ROB. Patient  states that he is not using CPAP at home. He refuse to use CPAP in the hospital.

## 2024-07-19 NOTE — CM/SW NOTE
Noted that pt was on Elliquis in the past and is prescribed it again. Pt entitled to the $10 copay card for Elliquis. Placed in chart for pt at discharge.     Jessica Leon MSW, LSW  Discharge Planner

## 2024-07-19 NOTE — PAYOR COMM NOTE
--------------  ADMISSION REVIEW     Payor: [employer]/HMO/POS/EPO  Subscriber #:  [id number]  Authorization Number: [id number]    Admit date: 7/19/24  Admit time:  1:54 PM     7/18 Patient Seen in: [facility] Emergency Department    PT WAS ADMITTED TO THE FLOOR ON 7/18 BUT THE FLOOR NURSE ERRONEOUSLY CANCELED THE INPT STATUS ORDER.    History   Stated Complaint: R leg pain for 3 days    Patient is a 39-year-old male presenting to the ED with right calf pain.  The history is obtained from patient who is a good historian.  The patient states he had similar symptoms about 10 months ago when he was diagnosed with a DVT.  He was put on Eliquis at that time but was taken off of anticoagulation a couple of months ago.  He also reports some shortness of breath that is worse than his baseline today.  He denies any chest pain.  The shortness of breath is constant and present at rest.  No cough or URI symptoms.  No fevers or chills.  No lower extremity edema.  No recent surgery or travel history.  The patient states that he does have a family history of blood clots.  He had some genetic testing performed recently but does not have the test results.  Pain in his right leg started a couple of days ago.  No recent trauma, injury, or strain.  Patient's pain is moderate in severity.    Objective:   Past Medical History:    ADHD    Anxiety    Asthma (HCC)    Attention deficit hyperactivity disorder (ADHD)    Atypical mole    Back pain    COPD (chronic obstructive pulmonary disease) (HCC)    Decorative tattoo    Esophageal reflux    Fatigue    Hearing impairment    NO APPLIANCE    Hearing loss    Heartburn    Hemorrhoids    Lab test positive for detection of COVID-19 virus    11/2021- NO HOSPITALIZATION- ASYMPTOMATIC    Perirectal fistula    Stool incontinence    Vertigo     Physical Exam     ED Triage Vitals [07/18/24 0356]   /85   Pulse 72   Resp 16   Temp 98.2 °F (36.8 °C)   Temp src    SpO2 99 %   O2  Device None (Room air)     Current Vitals:   Vital Signs  BP: 142/85  Pulse: 72  Resp: 16  Temp: 98.2 °F (36.8 °C)    Physical Exam  Cardiovascular:      Rate and Rhythm: Normal rate and regular rhythm.      Pulses: Normal pulses.   Pulmonary:      Effort: Pulmonary effort is normal. No respiratory distress.      Breath sounds: Normal breath sounds.   Abdominal:      General: Abdomen is flat. Bowel sounds are normal. There is no distension.      Tenderness: There is no abdominal tenderness.   Musculoskeletal:         General: Tenderness present. Normal range of motion.      Right lower leg: No edema.      Left lower leg: No edema.      Comments: Minimal right calf tenderness.  Negative Homans' sign.     Labs Reviewed   COMP METABOLIC PANEL (14) - Abnormal; Notable for the following components:       Result Value    Globulin  2.6 (*)     All other components within normal limits   TROPONIN I HIGH SENSITIVITY - Normal   CBC WITH DIFFERENTIAL WITH PLATELET     EKG 64 Sinus Rhythm  Reading: Normal    MDM      Testing considered and ordered includes EKG, CBC, CMP, troponin, ultrasound of the right lower extremity, CTA rule out PE.     RIGHT LOWER EXTREMITY DUPLEX ULTRASOUND:  Evidence of occlusive and nonocclusive filling defects involving the distal SFV, popliteal vein and PTV.      CT ANGIOGRAPHY, CHEST  Extensive pulmonary embolism throughout the right lung.  Minimal right heart strain is noted.  Clinical correlation is advised.  This report was communicated by telephone to Dr. Stauffer at the dictation time shown below.     Discussed with hospitalist.  Requesting cardiology consultation, on page for notification.    Interventions in care included IV heparin initiated in the ED.  Patient remains on cardiac monitor, normal sinus rhythm, continuous pulse oximetry, no hypoxia, vital signs stable.       Disposition and Plan     Clinical Impression:  1. Other acute pulmonary embolism, unspecified whether acute cor  pulmonale present ([facility])    2. Acute deep vein thrombosis (DVT) of proximal vein of right lower extremity ([facility])         CARDS:    Gregory Bustillo is a a(n) 39 year old male with history of DVT, strong family history of thromboembolic disease, asthma, presents to the hospital with complaints of right lower extremity pain.  He is also complained of right lower extremity swelling.  He states over the last few days he has been noticing some cramping in his right lower extremity.  He tried to roll it out with no success.  This morning it woke him up at 2:00 in the morning and he decided come in.  When walking from the parking lot into the emergency room door he noted that he was also somewhat short of breath.  He currently is not requiring any oxygen.  His troponin is negative.  No evidence of any EKG changes.  Patient was diagnosed with a DVT last fall and was placed on anticoagulation.  He had extensive hematology workup that so far negative.  He was recently taken off anticoagulation a few months ago.       Past Medical History       Past Medical History:    ADHD    Anxiety    Asthma ([facility])    Attention deficit hyperactivity disorder (ADHD)    Atypical mole    Back pain    COPD (chronic obstructive pulmonary disease) ([facility])    Decorative tattoo    Esophageal reflux    Fatigue    Hearing impairment     NO APPLIANCE    Hearing loss    Heartburn    Hemorrhoids    Lab test positive for detection of COVID-19 virus     11/2021- NO HOSPITALIZATION- ASYMPTOMATIC    Perirectal fistula    Stool incontinence    Vertigo         Past Surgical History[]Expand by Default         Past Surgical History:   Procedure Laterality Date    Adenoidectomy        Colonoscopy N/A 01/10/2023     Procedure: ENDOSCOPIC ULTRASOUND (EUS), COLONOSCOPY;  Surgeon: Ervin Pittman MD;  Location:  ENDOSCOPY    Other         gastric sleeve 2019    Tonsillectomy               Current Facility-Administered Medications:     heparin (Porcine) 17788  units/250 mL infusion ED (PE/DVT/THROMBUS) INITIAL DOSE, 18 Units/kg/hr, Intravenous, Once    heparin (Porcine) 98171 units/250mL infusion PE/DVT/THROMBUS CONTINUOUS, 200-3,000 Units/hr, Intravenous, Continuous      /89   Pulse 59   Temp 98.2 °F (36.8 °C)   Resp 15   Wt (!) 320 lb (145.2 kg)   SpO2 100%   BMI 41.09 kg/m²   Temp (24hrs), Av.2 °F (36.8 °C), Min:98.2 °F (36.8 °C), Max:98.2 °F (36.8 °C)     Wt Readings from Last 3 Encounters:   24 (!) 320 lb (145.2 kg)     Lab Results   Component Value Date     WBC 6.6 2024     HGB 14.2 2024     HCT 40.0 2024     .0 2024     CREATSERUM 0.98 2024     BUN 9 2024      2024     K 4.3 2024      2024     CO2 28.0 2024     GLU 93 2024     CA 9.4 2024     ALB 4.2 2024     ALKPHO 52 2024     BILT 0.7 2024     TP 6.8 2024     AST 21 2024     ALT 22 2024     PTT 28.8 2024     Impression:  Principal Problem:    Other acute pulmonary embolism, unspecified whether acute cor pulmonale present (HCC)  RLE  Acute DVT extending from posterior tibial vein to the proximal SFV     Recommendations:  -Obtain echocardiogram to assess right heart hemodynamic  -continue IV heparin  -if no evidence of RH strain by echo, will proceed with RLE mechanical thrombectomy  -will need lifelong anticoagulation  -recommend Hem/Onc eval    History and Physical      Gregory BONG Bustillo is a 39 year old male with pMH DVT, asthma, ADHD, COPD, who p/w calf pain and sob. Has had 2 days of R calf pain similar to previous DVT 10 months ago. Has been off of eliquis for several months. No provoking factors. Today developed sob. Just had hypercoag labs ordered this week which are pending. Has multiple family members w/ clotting d/o's.     Assessment & Plan:  #acute RLE DVT - extensive  #acute PE w/ R heart strain on CT  -cards for thrombectomy today  -ECHO neg for  heart strain   -heme consult  -outpt hypercoag labs neg  -likely life long AC     #LLE pain  -chronic from prev DVT?  -d/w cards. Will try to get stat doppler of LLE prior to thrombectomy. If unable to, can do afterwards.      #Asthma/COPD  #ADHD        CARDIAC PROCEDURE TRANSCRIPTION     THROMBECTOMY     PREOPERATIVE DIAGNOSIS:    1.     Symptomatic right femoral deep vein thrombosis.  2.     History of hypercoagulable state with previous deep vein thromboses.  3.     Asthma.   POSTOPERATIVE DIAGNOSIS:    PROCEDURE PERFORMED:  Thrombectomy.    SUMMARY:  The patient today underwent thrombectomy of the right femoral vein using a ClotTriever device.  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  This may need to be addressed eventually as an outpatient.     7/19:    HEME/ONC:    38 y/o M PMH LLE DVT who is admitted for recurrent DVT and PE.     - reports he was taking care of his brother who just had a MI a few days ago in this hospital  - when we last talked in 01/2024 he was supposed to de-escalate to xarelto 10mg once daily after completing 6 months of therapeutic anticoagulation and complete his CT and US but he did not do this. He said he saw a primary care doctor who took him off xarelto and repeated hypercoagulable testing of which we do not have the results  - several days ago had right leg numbness and kept brushing it off but had progressive swelling and cramping and came to ED. Also noted SOB  - RLE with DVT in mid right posterior tibial vein with partially occlusive thrombus extending from distal right superficial femoral vein into proximal right posterior tibial vein  - CTA with extensive PE throughout right lung  - echo without R heart strain  - had a thrombectomy of R femoral vein 7/18  - now on heparin gtt, reports R leg feels better      Scheduled Medications    amphetamine-dextroamphetamine  20 mg Oral TID    montelukast  10 mg Oral Daily    clonazePAM  1 mg Oral TID    sodium chloride    Intravenous On Call    aspirin  324 mg Oral Once         Medication Infusions[]Expand by Default    continuous dose heparin 2,300 Units/hr (07/19/24 0333)    sodium chloride 50 mL/hr at 07/18/24 1757            Vital Signs:  Height: --  Weight: 145.2 kg (320 lb) (07/18 1801)  BSA (Calculated - sq m): --  Pulse: 58 (07/19 0342)  BP: 103/67 (07/19 0342)  Temp: 97.9 °F (36.6 °C) (07/19 0342)  Do Not Use - Resp Rate: --  SpO2: 98 % (07/19 0342)    Physical Examination:  General: Patient is alert and oriented, not in acute distress  Psych: Mood and affect are appropriate  Eyes: EOMI, PERRL  ENT: Oropharynx is clear, no adenopathy  CV: no LE edema  Respiratory: Non-labored respirations  GI/Abd: Soft, non-tender  Neurological: Grossly intact   Skin: no rashes or petechiae     Lab 07/18/24  0507 07/18/24  1754 07/19/24  0647   WBC 6.6 7.6 6.2   HGB 14.2 13.8 14.1   HCT 40.0 39.3 40.1   .0 149.0* 146.0*  146.0*   MCV 88.3 88.9 90.1   RDW 11.8 11.9 11.9   NEPRELIM 3.90  --  3.18      Lab 07/18/24  0507 07/18/24  1754 07/19/24  0647    137 136   K 4.3 3.7 4.2    106 104   CO2 28.0 27.0 28.0   BUN 9 12 11   CREATSERUM 0.98 0.95 0.82   GLU 93 94 83   CA 9.4 9.4 9.0   TP 6.8  --  6.5   ALB 4.2  --  4.0   ALKPHO 52  --  54   AST 21  --  15   ALT 22  --  18   BILT 0.7  --  0.9       Impression & Plan:      RLE DVT  PE  - given recurrence, unprovoked x2, recommended lifelong anticoagulation  - had prior hypercoagulable testing - FVL, prothrombin gene mutation testing, as well as anti-cardiolipin and beta-glycoprotein antibody testing which were all negative  - send lupus anticoagulant testing as this could not be done before when he was on a DOAC  - check LLE doppler for interval assessment of DVT and new baseline  - continue heparin gtt for now; plan transition tomorrow to apixaban 10mg BID x 7 days followed by apixaban 5mg BID; rx sent     Dispo: likely tomorrow when transitioned to apixaban    MEDICATIONS  ADMINISTERED IN LAST 1 DAY:    heparin (Porcine) 00398 units/250mL infusion PE/DVT/THROMBUS CONTINUOUS       Date Action Dose Route User    7/19/2024 0333 New Bag 2,300 Units/hr Intravenous Sudeep Hobbs RN    7/18/2024 2122 Hi-Risk Rate/Dose Change 2,300 Units/hr Intravenous Sudeep Hobbs RN          HYDROcodone-acetaminophen (Norco) 5-325 MG per tab 2 tablet       Date Action Dose Route User    7/19/2024 0922 Given 2 tablet Oral Silke Powell RN    7/19/2024 0334 Given 2 tablet Oral Sudeep Hobbs RN    7/18/2024 2212 Given 2 tablet Oral Sudeep Hobbs RN    7/18/2024 1755 Given 2 tablet Oral Mark Mora RN       ondansetron (Zofran) 4 MG/2ML injection 4 mg       Date Action Dose Route User    7/19/2024 1237 Given 4 mg Intravenous Silke Powell RN          sodium chloride 0.9% infusion       Date Action Dose Route User    7/18/2024 1757 New Bag (none) Intravenous Mark Mora RN

## 2024-07-19 NOTE — PLAN OF CARE
Patient received in bed with spouse at the bedside, alert and oriented x 4.. On RA maintaining adequate O2 saturations. NSR/SB on tele. Continent of bowel and bladder. Reports of pain on right lower extremity, prn pain medication given. Right calf incision site with dressing C/D/I, soft and no hematoma. No complaints of shortness of breath or chest pain/discomfort. Heparin drip infusing at 2300 units/hr per PE/DVT protocol. POC : US LLE, Heparin gtt, Heme to see.. Fall precautions in place. Call light within reach.    Problem: Patient/Family Goals  Goal: Patient/Family Long Term Goal  Description: Patient's Long Term Goal: Stay out of hospital    Interventions:  - Follow up with PCP after discharge  - Take medications as prescribed  - See additional Care Plan goals for specific interventions  Outcome: Progressing  Goal: Patient/Family Short Term Goal  Description: Patient's Short Term Goal: Feel better and go home    Interventions:   - Test ordered  - Monitor labs  - Participate in the plan of care  - See additional Care Plan goals for specific interventions  Outcome: Progressing     Problem: PAIN - ADULT  Goal: Verbalizes/displays adequate comfort level or patient's stated pain goal  Description: INTERVENTIONS:  - Encourage pt to monitor pain and request assistance  - Assess pain using appropriate pain scale  - Administer analgesics based on type and severity of pain and evaluate response  - Implement non-pharmacological measures as appropriate and evaluate response  - Consider cultural and social influences on pain and pain management  - Manage/alleviate anxiety  - Utilize distraction and/or relaxation techniques  - Monitor for opioid side effects  - Notify MD/LIP if interventions unsuccessful or patient reports new pain  - Anticipate increased pain with activity and pre-medicate as appropriate  Outcome: Progressing

## 2024-07-20 VITALS
RESPIRATION RATE: 18 BRPM | HEART RATE: 75 BPM | WEIGHT: 315 LBS | BODY MASS INDEX: 41 KG/M2 | TEMPERATURE: 99 F | DIASTOLIC BLOOD PRESSURE: 71 MMHG | SYSTOLIC BLOOD PRESSURE: 115 MMHG | OXYGEN SATURATION: 96 %

## 2024-07-20 PROBLEM — Z79.01 ANTICOAGULATED: Status: ACTIVE | Noted: 2024-07-20

## 2024-07-20 LAB
APTT PPP: 97.8 SECONDS (ref 23–36)
PLATELET # BLD AUTO: 143 10(3)UL (ref 150–450)

## 2024-07-20 PROCEDURE — 99239 HOSP IP/OBS DSCHRG MGMT >30: CPT | Performed by: HOSPITALIST

## 2024-07-20 PROCEDURE — 99232 SBSQ HOSP IP/OBS MODERATE 35: CPT | Performed by: INTERNAL MEDICINE

## 2024-07-20 NOTE — DISCHARGE SUMMARY
[place] HOSPITALIST  DISCHARGE SUMMARY     Gregory Bustillo Patient Status:  Inpatient    1985 MRN [id number]   Location [facility] 8NE-A Attending No att. providers found   Hosp Day # 1 PCP Israel Pelaez MD     Date of Admission: 2024  Date of Discharge:  2024     Discharge Disposition: Home or Self Care    Discharge Diagnosis:  #acute RLE DVT - extensive  #acute PE w/ R heart strain on CT  -cards - d/w cards. S/p thrombectomy   -ECHO neg for heart strain   -heme consult  -outpt hypercoag labs neg  -life long AC  -hep gtt, transition to doac      #LLE pain  -LLE doppler neg     #Asthma/COPD  #ADHD    History of Present Illness: Gregoyr Bustillo is a 39 year old male with pMH DVT, asthma, ADHD, COPD, who p/w calf pain and sob. Has had 2 days of R calf pain similar to previous DVT 10 months ago. Has been off of eliquis for several months. No provoking factors. Today developed sob. Just had hypercoag labs ordered this week which are pending. Has multiple family members w/ clotting d/o's.           Brief Synopsis: pt w/ acute R DVT and PE. Concern for heart strain on CT but neg on echo. Extensive clot in leg. Underwent thrombectomy. Tolerated well. Hep gtt transitioned to DOAC. Lifelong. F/u heme for hypercoag w/u.     Lace+ Score: 50  59-90 High Risk  29-58 Medium Risk  0-28   Low Risk       TCM Follow-Up Recommendation:  LACE 29-58: Moderate Risk of readmission after discharge from the hospital.      Procedures during hospitalization:   thrombectomy    Incidental or significant findings and recommendations (brief descriptions):  none    Lab/Test results pending at Discharge:   none    Consultants:  mic Rios    Discharge Medication List:     Discharge Medications        START taking these medications        Instructions Prescription details   apixaban 5 MG Tabs  Commonly known as: Eliquis      Take 2 tabs (10mg) by mouth twice daily for 7 days, then take 1 tab (5mg) by mouth twice  daily thereafter.   Quantity: 74 tablet  Refills: 0            CONTINUE taking these medications        Instructions Prescription details   albuterol 108 (90 Base) MCG/ACT Aers  Commonly known as: Ventolin HFA      Inhale 1 puff into the lungs every 4 (four) hours as needed.   Refills: 0     amphetamine-dextroamphetamine 20 MG Tabs  Commonly known as: Adderall      Take 1 tablet by mouth 3 (three) times daily.   Refills: 0     clonazePAM 2 MG Tabs  Commonly known as: KlonoPIN      Take 0.5 tablets (1 mg total) by mouth 3 (three) times daily.   Refills: 0     montelukast 10 MG Tabs  Commonly known as: Singulair      Take 1 tablet (10 mg total) by mouth daily.   Refills: 0               Where to Get Your Medications        These medications were sent to [id number] [place] [place] - [address] AT [facility] [address], [phone], [phone]  [address] [place] [address]      Phone: [phone]   apixaban 5 MG Tabs         [place]    Follow-up appointment:   Chapin Pedro MD  [address] [address]  [phone]    Follow up in 1 month(s)  Our office will call to schedule appointment    Israel Pelaez MD  [address] [facility] [address] [phone]    Schedule an appointment as soon as possible for a visit      Appointments for Next 30 Days 7/20/2024 - 8/19/2024        Date Arrival Time Visit Type Length Department Provider     8/2/2024  9:00 AM  [facility] CTA GATED COR ART W [facility] [2651] 30 min [facility] CT [facility] CT RM1 CARD     8/2/2024  9:00 AM  [facility] CTA GATED COR ART W [facility] [2651] 30 min [facility] X-ray [facility] RN RADIOLOGY 3    Patient Instructions:     Please arrive 15 minutes prior to your scheduled appointment time.    No exercise or caffeine after dinner the night before until after the exam. You may have a light breakfast.  In the event you would like staff to administer Xanax to help you relax for the procedure,  please make sure you have an adult to drive you home. If you want Xanax but do not bring a , you will not be given Xanax.   If you received a call from the ordering Doctor to take medication prior to the procedure, you may take those medications as prescribed. If your heart rate is above 60 on arrival, you will be given oral Metoprolol to lower your heart rate. We will then re-evaluate your heart rate 45 minutes to 1 hour after Metoprolol was given. We take CT images when your heart rate is between 55-60 to ensure the most optimal images. Additional doses may need to be given.  You will be here a minimum of 1 hour. Depending on how your heart rate responds to the medication, the procedure could take up to 4 hours.  If you are on a Phosphodiesterase Inhibitor and/or Erectile Dysfunction medication such as Cialis, Viagra, or Levitra, it must be held 72 hours prior to the procedure until after the procedure. If you have taken any of those medications within 72 hours of the procedure, please call Central Scheduling at [phone] to reschedule your appointment.      Location Instructions:     Your appointment will be at [facility] [address] in [place], [place]. For self-parking please park in the Blue Lot. There is also  parking outside the Main Entrance. Enter the door that says Main Entrance. Inside the building walk to the left and check in with Diagnostic Main. The phone number for this location is [phone].  If you suspect you may be pregnant please consult with your physician prior to your exam.&nbsp; If you have a continuous glucose monitor you will be asked to remove it during the exam.  Please bring your insurance card and photo ID. You will also need to bring your doctor's order unless your physician's office submitted the order electronically or faxed the order. Without the order, your test may be delayed or postponed.&nbsp; Certain health screening tests may not  require an order.  Because of the nature of the Emergency Department, please be advised of the possibility that your appointment may be delayed.  Children: Children under the age of 12 must have another adult caregiver with them.&nbsp; Please do not bring your child/children without a caregiver.&nbsp; Because of the highly sensitive equipment and privacy of all our patients, children will not be permitted in the exam rooms, unless otherwise noted and in accordance with departmental policy.  PATIENT RESPONSIBILITY ESTIMATE  - (Estimate) We will provide you with your estimated remaining deductible and coinsurance balance for your services at the time of check in.  - (Payment) Please be aware that you may be asked for payment at the time of service.  Masks are optional for all patients and visitors, unless otherwise indicated.               2024 11:15 AM  POST-HOSPITAL FOLLOW-UP [2640] 15 min [facility] in [place] Teodoro Werner MD    Patient Instructions:         Location Instructions:     **IF YOU NEED LABWORK OR AN INFUSION ALONG WITH YOUR APPOINTMENT, YOU MUST CALL TO SCHEDULE.**  Your appointment is on the [facility] campus in the [facility] [place]. The address is [address] [place] [address]. Please register at the [facility]  on the second floor.  Masks are optional for all patients and visitors, unless otherwise indicated.                      Vital signs:  Temp:  [98.4 °F (36.9 °C)-98.6 °F (37 °C)] 98.5 °F (36.9 °C)  Pulse:  [56-75] 75  Resp:  [16-18] 18  BP: (104-115)/(60-71) 115/71  SpO2:  [95 %-98 %] 96 %    Physical Exam:    General: No acute distress   Lungs: clear to auscultation  Cardiovascular: S1, S2  Abdomen: Soft      -----------------------------------------------------------------------------------------------  PATIENT DISCHARGE INSTRUCTIONS: See electronic chart    Gerson Arriaza MD    Total time spent on discharge plannin minutes     The   Cures Act makes medical notes like these available to patients in the interest of transparency. Please be advised this is a medical document. Medical documents are intended to carry relevant information, facts as evident, and the clinical opinion of the practitioner. The medical note is intended as peer to peer communication and may appear blunt or direct. It is written in medical language and may contain abbreviations or verbiage that are unfamiliar.

## 2024-07-20 NOTE — PLAN OF CARE
Assumed care of patient at 0700; alert and oriented. Bilateral lung sounds clear; on room air. NSR on monitor. Patient denies chest pain, shortness of breath, dizziness. No issues while ambulating; weakness in left leg. Active bowel sounds; continent of bowel and bladder. Patient updated on plan of care for discontinuation of heparin to start direct oral anticoagulant, and discharge today questions answered. Bed in lowest position and call light within reach.

## 2024-07-20 NOTE — PLAN OF CARE
Patient alert and oriented x 4. Pleasant and cooperating well, On room air. sinus on cardiac monitor. Patient denies any chest pain or chest discomfort at this time. Patient voids, last BM 7/19,on heparin drip with PE/ DVT protocol , next ptt in am ,pain mgt norco helped, patient comfortable , rt  calf  incision site intact, . Plan of care updated, all questions answered. Safety precautions in place. Bed alarm on. Will continue to monitor tele/labs/vital signs closely.     Plan: Heparin gtt , start Eliquis today , pain control, Discharge plan today , ptt in am  Problem: Patient/Family Goals  Goal: Patient/Family Long Term Goal  Description: Patient's Long Term Goal: Stay out of hospital    Interventions:  - Follow up with PCP after discharge  - Take medications as prescribed  - See additional Care Plan goals for specific interventions  Outcome: Progressing  Goal: Patient/Family Short Term Goal  Description: Patient's Short Term Goal: Feel better and go home    Interventions:   - Test ordered  - Monitor labs  - Participate in the plan of care  - See additional Care Plan goals for specific interventions  Outcome: Progressing     Problem: PAIN - ADULT  Goal: Verbalizes/displays adequate comfort level or patient's stated pain goal  Description: INTERVENTIONS:  - Encourage pt to monitor pain and request assistance  - Assess pain using appropriate pain scale  - Administer analgesics based on type and severity of pain and evaluate response  - Implement non-pharmacological measures as appropriate and evaluate response  - Consider cultural and social influences on pain and pain management  - Manage/alleviate anxiety  - Utilize distraction and/or relaxation techniques  - Monitor for opioid side effects  - Notify MD/LIP if interventions unsuccessful or patient reports new pain  - Anticipate increased pain with activity and pre-medicate as appropriate  Outcome: Progressing     Problem: SAFETY ADULT - FALL  Goal: Free from fall  injury  Description: INTERVENTIONS:  - Assess pt frequently for physical needs  - Identify cognitive and physical deficits and behaviors that affect risk of falls.  - Evans fall precautions as indicated by assessment.  - Educate pt/family on patient safety including physical limitations  - Instruct pt to call for assistance with activity based on assessment  - Modify environment to reduce risk of injury  - Provide assistive devices as appropriate  - Consider OT/PT consult to assist with strengthening/mobility  - Encourage toileting schedule  Outcome: Progressing     Problem: DISCHARGE PLANNING  Goal: Discharge to home or other facility with appropriate resources  Description: INTERVENTIONS:  - Identify barriers to discharge w/pt and caregiver  - Include patient/family/discharge partner in discharge planning  - Arrange for needed discharge resources and transportation as appropriate  - Identify discharge learning needs (meds, wound care, etc)  - Arrange for interpreters to assist at discharge as needed  - Consider post-discharge preferences of patient/family/discharge partner  - Complete POLST form as appropriate  - Assess patient's ability to be responsible for managing their own health  - Refer to Case Management Department for coordinating discharge planning if the patient needs post-hospital services based on physician/LIP order or complex needs related to functional status, cognitive ability or social support system  Outcome: Progressing     Problem: CARDIOVASCULAR - ADULT  Goal: Maintains optimal cardiac output and hemodynamic stability  Description: INTERVENTIONS:  - Monitor vital signs, rhythm, and trends  - Monitor for bleeding, hypotension and signs of decreased cardiac output  - Evaluate effectiveness of vasoactive medications to optimize hemodynamic stability  - Monitor arterial and/or venous puncture sites for bleeding and/or hematoma  - Assess quality of pulses, skin color and temperature  - Assess  for signs of decreased coronary artery perfusion - ex. Angina  - Evaluate fluid balance, assess for edema, trend weights  Outcome: Progressing  Goal: Absence of cardiac arrhythmias or at baseline  Description: INTERVENTIONS:  - Continuous cardiac monitoring, monitor vital signs, obtain 12 lead EKG if indicated  - Evaluate effectiveness of antiarrhythmic and heart rate control medications as ordered  - Initiate emergency measures for life threatening arrhythmias  - Monitor electrolytes and administer replacement therapy as ordered  Outcome: Progressing     Problem: RESPIRATORY - ADULT  Goal: Achieves optimal ventilation and oxygenation  Description: INTERVENTIONS:  - Assess for changes in respiratory status  - Assess for changes in mentation and behavior  - Position to facilitate oxygenation and minimize respiratory effort  - Oxygen supplementation based on oxygen saturation or ABGs  - Provide Smoking Cessation handout, if applicable  - Encourage broncho-pulmonary hygiene including cough, deep breathe, Incentive Spirometry  - Assess the need for suctioning and perform as needed  - Assess and instruct to report SOB or any respiratory difficulty  - Respiratory Therapy support as indicated  - Manage/alleviate anxiety  - Monitor for signs/symptoms of CO2 retention  Outcome: Progressing     Problem: METABOLIC/FLUID AND ELECTROLYTES - ADULT  Goal: Electrolytes maintained within normal limits  Description: INTERVENTIONS:  - Monitor labs and rhythm and assess patient for signs and symptoms of electrolyte imbalances  - Administer electrolyte replacement as ordered  - Monitor response to electrolyte replacements, including rhythm and repeat lab results as appropriate  - Fluid restriction as ordered  - Instruct patient on fluid and nutrition restrictions as appropriate  Outcome: Progressing     Problem: HEMATOLOGIC - ADULT  Goal: Maintains hematologic stability  Description: INTERVENTIONS  - Assess for signs and symptoms of  bleeding or hemorrhage  - Monitor labs and vital signs for trends  - Administer supportive blood products/factors, fluids and medications as ordered and appropriate  - Administer supportive blood products/factors as ordered and appropriate  Outcome: Progressing

## 2024-07-20 NOTE — PROGRESS NOTES
[facility]  Progress Note    Gregory Bustillo Patient Status:  Inpatient    1985 MRN [id number]   Location [facility] 8NE-A Attending Matias Contreras*   Hosp Day # 1 PCP Israel Pelaez MD       SUBJECTIVE:    No new complaints.    OBJECTIVE:    Vitals:    245 24 2338 24 0635 24 0825   BP: 115/70 111/61 104/60 115/71   BP Location: Right arm Right arm  Right arm   Pulse: 74 63 56 75   Resp:    Temp: 98.4 °F (36.9 °C) 98.6 °F (37 °C)  98.5 °F (36.9 °C)   TempSrc: Oral Oral  Oral   SpO2: 98% 97% 95% 96%   Weight:           Wt Readings from Last 1 Encounters:   24 (!) 145.2 kg (320 lb)       LABS:  Recent Labs   Lab 24  0507 24  1754 24  0647 24  0609   RBC 4.53 4.42 4.45  --    HGB 14.2 13.8 14.1  --    HCT 40.0 39.3 40.1  --    MCV 88.3 88.9 90.1  --    MCH 31.3 31.2 31.7  --    MCHC 35.5 35.1 35.2  --    RDW 11.8 11.9 11.9  --    NEPRELIM 3.90  --  3.18  --    WBC 6.6 7.6 6.2  --    .0 149.0* 146.0*  146.0* 143.0*         Recent Labs   Lab 24  0507 24  1754 24  0647   GLU 93 94 83   BUN 9 12 11   CREATSERUM 0.98 0.95 0.82   EGFRCR 102 106 116   CA 9.4 9.4 9.0   ALB 4.2  --  4.0    137 136   K 4.3 3.7 4.2    106 104   CO2 28.0 27.0 28.0   ALKPHO 52  --  54   AST 21  --  15   ALT 22  --  18   BILT 0.7  --  0.9   TP 6.8  --  6.5       MEDICATIONS:     apixaban  10 mg Oral BID    amphetamine-dextroamphetamine  20 mg Oral TID    montelukast  10 mg Oral Daily    clonazePAM  1 mg Oral TID    aspirin  324 mg Oral Once       albuterol    acetaminophen    melatonin    polyethylene glycol (PEG 3350)    sennosides    bisacodyl    fleet enema    benzonatate    acetaminophen **OR** HYDROcodone-acetaminophen **OR** HYDROcodone-acetaminophen    ondansetron    prochlorperazine    PHYSICAL EXAM:    General: Patient is alert and oriented x 3, not in acute distress.  Chest: Clear to  auscultation.  Heart: Regular rate and rhythm.   Abdomen: Soft, non tender with good bowel sounds.  Extremities: No edema.  Neurological: Grossly intact.     RADIOLOGY:     ASSESSMENT/PLAN:    # Recurrent VTE: Right leg DVT and extensive PE. Candidate for indefinite anticoagulation.  On apixaban.   No DVT L leg.  Okay to discharge from hematology perspective.  He will follow-up with Dr. Werner outpatient.  Thanks, we will sign off.    Carol Willams M.D.    [address] [place], [address]    7/20/2024  11:46 AM

## 2024-07-20 NOTE — PLAN OF CARE
Patient tele discontinued. IV removed with catheter intact. Patient denies CP, SOB, dizziness, palpations, and calf tenderness. Discharge instructions reviewed with patient and wife, verbalized understanding. Medications and side effects reviewed with patient and sent to pharmacy of choice. Patient escorted via wheelchair to lobby.

## 2024-07-23 LAB
APTT PPP: 97.7 SECONDS (ref 23–36)
INR BLD: 1.03 (ref 0.85–1.16)
LA 3 SCREEN W REFLEX-IMP: NEGATIVE
PROTHROMBIN TIME: 13.5 SECONDS (ref 11.6–14.8)
SCREEN DRVVT: 1.15 S (ref 0–1.29)
SCREEN DRVVT: NEGATIVE S
STACLOT LA DELTA: 6.3 SECONDS (ref ?–8)

## 2024-07-25 NOTE — PAYOR COMM NOTE
--------------  DISCHARGE REVIEW    Payor: [employer]/HMO/POS/EPO  Subscriber #:  [id number]  Authorization Number: [id number]    Admit date: 24  Admit time:   1:54 PM  Discharge Date: 2024  1:00 PM     Admitting Physician: Matias Contreras DO  Attending Physician:  No att. providers found  Primary Care Physician: [place] Pelaez MD          Discharge Summary Notes        Discharge Summary signed by Gerson Arriaza MD at 2024  5:52 PM       Author: Gerson Arriaza MD Specialty: HOSPITALIST, Internal Medicine Author Type: Physician    Filed: 2024  5:52 PM Date of Service: 2024  5:51 PM Status: Signed    : Gerson Arriaza MD (Physician)           [facility]  DISCHARGE SUMMARY     Gregory Bustillo Patient Status:  Inpatient    1985 MRN [id number]   Location [address] Attending No att. providers found   Hosp Day # 1 PCP [place] Pelaez MD     Date of Admission: 2024  Date of Discharge:  2024     Discharge Disposition: Home or Self Care    Discharge Diagnosis:  #acute RLE DVT - extensive  #acute PE w/ R heart strain on CT  -cards - d/w cards. S/p thrombectomy   -ECHO neg for heart strain   -heme consult  -outpt hypercoag labs neg  -life long AC  -hep gtt, transition to doac      #LLE pain  -LLE doppler neg     #Asthma/COPD  #ADHD    History of Present Illness: Gregory Bustillo is a 39 year old male with pMH DVT, asthma, ADHD, COPD, who p/w calf pain and sob. Has had 2 days of R calf pain similar to previous DVT 10 months ago. Has been off of eliquis for several months. No provoking factors. Today developed sob. Just had hypercoag labs ordered this week which are pending. Has multiple family members w/ clotting d/o's.           Brief Synopsis: pt w/ acute R DVT and PE. Concern for heart strain on CT but neg on echo. Extensive clot in leg. Underwent thrombectomy. Tolerated well. Hep gtt transitioned to DOAC. Lifelong. F/u  heme for hypercoag w/u.     Lace+ Score: 50  59-90 High Risk  29-58 Medium Risk  0-28   Low Risk       TCM Follow-Up Recommendation:  LACE 29-58: Moderate Risk of readmission after discharge from the hospital.      Procedures during hospitalization:   thrombectomy    Incidental or significant findings and recommendations (brief descriptions):  none    Lab/Test results pending at Discharge:   none    Consultants:  mic Rios    Discharge Medication List:     Discharge Medications        START taking these medications        Instructions Prescription details   apixaban 5 MG Tabs  Commonly known as: Eliquis      Take 2 tabs (10mg) by mouth twice daily for 7 days, then take 1 tab (5mg) by mouth twice daily thereafter.   Quantity: 74 tablet  Refills: 0            CONTINUE taking these medications        Instructions Prescription details   albuterol 108 (90 Base) MCG/ACT Aers  Commonly known as: Ventolin HFA      Inhale 1 puff into the lungs every 4 (four) hours as needed.   Refills: 0     amphetamine-dextroamphetamine 20 MG Tabs  Commonly known as: Adderall      Take 1 tablet by mouth 3 (three) times daily.   Refills: 0     clonazePAM 2 MG Tabs  Commonly known as: KlonoPIN      Take 0.5 tablets (1 mg total) by mouth 3 (three) times daily.   Refills: 0     montelukast 10 MG Tabs  Commonly known as: Singulair      Take 1 tablet (10 mg total) by mouth daily.   Refills: 0               Where to Get Your Medications        These medications were sent to [id number] - [place], [place] - [address] AT [facility] [address], [phone], [phone]  [address] [facility] [address]      Phone: [phone]   apixaban 5 MG Tabs         [place]    Follow-up appointment:   Chapin Pedro MD  [address] [address] [phone]    Follow up in 1 month(s)  Our office will call to schedule appointment    [place] Pelaez MD  [address] [facility]  [address]  [phone]    Schedule an appointment as soon as possible for a visit      Appointments for Next 30 Days 7/20/2024 - 8/19/2024        Date Arrival Time Visit Type Length Department Provider     8/2/2024  9:00 AM  [facility] CTA GATED COR ART W STIVEN SC [2651] 30 min [facility] CT [facility] CT RM1 CARD     8/2/2024  9:00 AM  [facility] CTA GATED COR ART W STIVEN SC [2655] 30 min [facility] X-ray [facility] RN RADIOLOGY 3    Patient Instructions:     Please arrive 15 minutes prior to your scheduled appointment time.    No exercise or caffeine after dinner the night before until after the exam. You may have a light breakfast.  In the event you would like staff to administer Xanax to help you relax for the procedure, please make sure you have an adult to drive you home. If you want Xanax but do not bring a , you will not be given Xanax.   If you received a call from the ordering Doctor to take medication prior to the procedure, you may take those medications as prescribed. If your heart rate is above 60 on arrival, you will be given oral Metoprolol to lower your heart rate. We will then re-evaluate your heart rate 45 minutes to 1 hour after Metoprolol was given. We take CT images when your heart rate is between 55-60 to ensure the most optimal images. Additional doses may need to be given.  You will be here a minimum of 1 hour. Depending on how your heart rate responds to the medication, the procedure could take up to 4 hours.  If you are on a Phosphodiesterase Inhibitor and/or Erectile Dysfunction medication such as Cialis, Viagra, or Levitra, it must be held 72 hours prior to the procedure until after the procedure. If you have taken any of those medications within 72 hours of the procedure, please call Central Scheduling at [phone] to reschedule your appointment.      Location Instructions:     Your appointment will be at [facility] located at [address] [facility] in [place], [place]. For self-parking please  park in the Blue Lot. There is also  parking outside the Main Entrance. Enter the door that says Main Entrance. Inside the building walk to the left and check in with Diagnostic Main. The phone number for this location is [phone].  If you suspect you may be pregnant please consult with your physician prior to your exam.&nbsp; If you have a continuous glucose monitor you will be asked to remove it during the exam.  Please bring your insurance card and photo ID. You will also need to bring your doctor's order unless your physician's office submitted the order electronically or faxed the order. Without the order, your test may be delayed or postponed.&nbsp; Certain health screening tests may not require an order.  Because of the nature of the Emergency Department, please be advised of the possibility that your appointment may be delayed.  Children: Children under the age of 12 must have another adult caregiver with them.&nbsp; Please do not bring your child/children without a caregiver.&nbsp; Because of the highly sensitive equipment and privacy of all our patients, children will not be permitted in the exam rooms, unless otherwise noted and in accordance with departmental policy.  PATIENT RESPONSIBILITY ESTIMATE  - (Estimate) We will provide you with your estimated remaining deductible and coinsurance balance for your services at the time of check in.  - (Payment) Please be aware that you may be asked for payment at the time of service.  Masks are optional for all patients and visitors, unless otherwise indicated.               8/6/2024 11:15 AM  POST-HOSPITAL FOLLOW-UP [2640] 15 min [facility] [place] in [place] Teodoro Werner MD    Patient Instructions:         Location Instructions:     **IF YOU NEED LABWORK OR AN INFUSION ALONG WITH YOUR APPOINTMENT, YOU MUST CALL TO SCHEDULE.**  Your appointment is on the [facility] campus in the Cancer Center. The address is [address] [place] [address],  [place]. Please register at the Cancer Center  on the second floor.  Masks are optional for all patients and visitors, unless otherwise indicated.                      Vital signs:  Temp:  [98.4 °F (36.9 °C)-98.6 °F (37 °C)] 98.5 °F (36.9 °C)  Pulse:  [56-75] 75  Resp:  [16-18] 18  BP: (104-115)/(60-71) 115/71  SpO2:  [95 %-98 %] 96 %    Physical Exam:    General: No acute distress   Lungs: clear to auscultation  Cardiovascular: S1, S2  Abdomen: Soft      -----------------------------------------------------------------------------------------------  PATIENT DISCHARGE INSTRUCTIONS: See electronic chart    Gerson Arriaza MD    Total time spent on discharge plannin minutes     The  [employer] Cures Act makes medical notes like these available to patients in the interest of transparency. Please be advised this is a medical document. Medical documents are intended to carry relevant information, facts as evident, and the clinical opinion of the practitioner. The medical note is intended as peer to peer communication and may appear blunt or direct. It is written in medical language and may contain abbreviations or verbiage that are unfamiliar.     Electronically signed by Gerson Arriaza MD on 2024  5:52 PM         REVIEWER COMMENTS

## 2024-07-26 NOTE — PAYOR COMM NOTE
--------------  ADMISSION REVIEW     Payor: [employer]/HMO/POS/EPO  Subscriber #:  [id number]  Authorization Number: [id number]    Admit date: 7/19/24  Admit time:  1:54 PM     7/18 Patient Seen in: [facility] Emergency Department    PT WAS ADMITTED TO THE FLOOR ON 7/18 BUT THE FLOOR NURSE ERRONEOUSLY CANCELED THE INPT STATUS ORDER.    History   Stated Complaint: R leg pain for 3 days    Patient is a 39-year-old male presenting to the ED with right calf pain.  The history is obtained from patient who is a good historian.  The patient states he had similar symptoms about 10 months ago when he was diagnosed with a DVT.  He was put on Eliquis at that time but was taken off of anticoagulation a couple of months ago.  He also reports some shortness of breath that is worse than his baseline today.  He denies any chest pain.  The shortness of breath is constant and present at rest.  No cough or URI symptoms.  No fevers or chills.  No lower extremity edema.  No recent surgery or travel history.  The patient states that he does have a family history of blood clots.  He had some genetic testing performed recently but does not have the test results.  Pain in his right leg started a couple of days ago.  No recent trauma, injury, or strain.  Patient's pain is moderate in severity.    Objective:   Past Medical History:    ADHD    Anxiety    Asthma (HCC)    Attention deficit hyperactivity disorder (ADHD)    Atypical mole    Back pain    COPD (chronic obstructive pulmonary disease) (HCC)    Decorative tattoo    Esophageal reflux    Fatigue    Hearing impairment    NO APPLIANCE    Hearing loss    Heartburn    Hemorrhoids    Lab test positive for detection of COVID-19 virus    11/2021- NO HOSPITALIZATION- ASYMPTOMATIC    Perirectal fistula    Stool incontinence    Vertigo     Physical Exam     ED Triage Vitals [07/18/24 0356]   /85   Pulse 72   Resp 16   Temp 98.2 °F (36.8 °C)   Temp src    SpO2 99 %   O2  Device None (Room air)     Current Vitals:   Vital Signs  BP: 142/85  Pulse: 72  Resp: 16  Temp: 98.2 °F (36.8 °C)    Physical Exam  Cardiovascular:      Rate and Rhythm: Normal rate and regular rhythm.      Pulses: Normal pulses.   Pulmonary:      Effort: Pulmonary effort is normal. No respiratory distress.      Breath sounds: Normal breath sounds.   Abdominal:      General: Abdomen is flat. Bowel sounds are normal. There is no distension.      Tenderness: There is no abdominal tenderness.   Musculoskeletal:         General: Tenderness present. Normal range of motion.      Right lower leg: No edema.      Left lower leg: No edema.      Comments: Minimal right calf tenderness.  Negative Homans' sign.     Labs Reviewed   COMP METABOLIC PANEL (14) - Abnormal; Notable for the following components:       Result Value    Globulin  2.6 (*)     All other components within normal limits   TROPONIN I HIGH SENSITIVITY - Normal   CBC WITH DIFFERENTIAL WITH PLATELET     EKG 64 Sinus Rhythm  Reading: Normal    MDM      Testing considered and ordered includes EKG, CBC, CMP, troponin, ultrasound of the right lower extremity, CTA rule out PE.     RIGHT LOWER EXTREMITY DUPLEX ULTRASOUND:  Evidence of occlusive and nonocclusive filling defects involving the distal SFV, popliteal vein and PTV.      CT ANGIOGRAPHY, CHEST  Extensive pulmonary embolism throughout the right lung.  Minimal right heart strain is noted.  Clinical correlation is advised.  This report was communicated by telephone to Dr. Stauffer at the dictation time shown below.     Discussed with hospitalist.  Requesting cardiology consultation, on page for notification.    Interventions in care included IV heparin initiated in the ED.  Patient remains on cardiac monitor, normal sinus rhythm, continuous pulse oximetry, no hypoxia, vital signs stable.       Disposition and Plan     Clinical Impression:  1. Other acute pulmonary embolism, unspecified whether acute cor  pulmonale present ([facility])    2. Acute deep vein thrombosis (DVT) of proximal vein of right lower extremity ([facility])         CARDS:    Gregory Bustillo is a a(n) 39 year old male with history of DVT, strong family history of thromboembolic disease, asthma, presents to the hospital with complaints of right lower extremity pain.  He is also complained of right lower extremity swelling.  He states over the last few days he has been noticing some cramping in his right lower extremity.  He tried to roll it out with no success.  This morning it woke him up at 2:00 in the morning and he decided come in.  When walking from the parking lot into the emergency room door he noted that he was also somewhat short of breath.  He currently is not requiring any oxygen.  His troponin is negative.  No evidence of any EKG changes.  Patient was diagnosed with a DVT last fall and was placed on anticoagulation.  He had extensive hematology workup that so far negative.  He was recently taken off anticoagulation a few months ago.       Past Medical History       Past Medical History:    ADHD    Anxiety    Asthma ([facility])    Attention deficit hyperactivity disorder (ADHD)    Atypical mole    Back pain    COPD (chronic obstructive pulmonary disease) ([facility])    Decorative tattoo    Esophageal reflux    Fatigue    Hearing impairment     NO APPLIANCE    Hearing loss    Heartburn    Hemorrhoids    Lab test positive for detection of COVID-19 virus     11/2021- NO HOSPITALIZATION- ASYMPTOMATIC    Perirectal fistula    Stool incontinence    Vertigo         Past Surgical History[]Expand by Default         Past Surgical History:   Procedure Laterality Date    Adenoidectomy        Colonoscopy N/A 01/10/2023     Procedure: ENDOSCOPIC ULTRASOUND (EUS), COLONOSCOPY;  Surgeon: Ervin Pittman MD;  Location:  ENDOSCOPY    Other         gastric sleeve 2019    Tonsillectomy               Current Facility-Administered Medications:     heparin (Porcine) 22657  units/250 mL infusion ED (PE/DVT/THROMBUS) INITIAL DOSE, 18 Units/kg/hr, Intravenous, Once    heparin (Porcine) 26465 units/250mL infusion PE/DVT/THROMBUS CONTINUOUS, 200-3,000 Units/hr, Intravenous, Continuous      /89   Pulse 59   Temp 98.2 °F (36.8 °C)   Resp 15   Wt (!) 320 lb (145.2 kg)   SpO2 100%   BMI 41.09 kg/m²   Temp (24hrs), Av.2 °F (36.8 °C), Min:98.2 °F (36.8 °C), Max:98.2 °F (36.8 °C)     Wt Readings from Last 3 Encounters:   24 (!) 320 lb (145.2 kg)     Lab Results   Component Value Date     WBC 6.6 2024     HGB 14.2 2024     HCT 40.0 2024     .0 2024     CREATSERUM 0.98 2024     BUN 9 2024      2024     K 4.3 2024      2024     CO2 28.0 2024     GLU 93 2024     CA 9.4 2024     ALB 4.2 2024     ALKPHO 52 2024     BILT 0.7 2024     TP 6.8 2024     AST 21 2024     ALT 22 2024     PTT 28.8 2024     Impression:  Principal Problem:    Other acute pulmonary embolism, unspecified whether acute cor pulmonale present (HCC)  RLE  Acute DVT extending from posterior tibial vein to the proximal SFV     Recommendations:  -Obtain echocardiogram to assess right heart hemodynamic  -continue IV heparin  -if no evidence of RH strain by echo, will proceed with RLE mechanical thrombectomy  -will need lifelong anticoagulation  -recommend Hem/Onc eval    History and Physical      Gregory BONG Bustillo is a 39 year old male with pMH DVT, asthma, ADHD, COPD, who p/w calf pain and sob. Has had 2 days of R calf pain similar to previous DVT 10 months ago. Has been off of eliquis for several months. No provoking factors. Today developed sob. Just had hypercoag labs ordered this week which are pending. Has multiple family members w/ clotting d/o's.     Assessment & Plan:  #acute RLE DVT - extensive  #acute PE w/ R heart strain on CT  -cards for thrombectomy today  -ECHO neg for  heart strain   -heme consult  -outpt hypercoag labs neg  -likely life long AC     #LLE pain  -chronic from prev DVT?  -d/w cards. Will try to get stat doppler of LLE prior to thrombectomy. If unable to, can do afterwards.      #Asthma/COPD  #ADHD        CARDIAC PROCEDURE TRANSCRIPTION     THROMBECTOMY     PREOPERATIVE DIAGNOSIS:    1.     Symptomatic right femoral deep vein thrombosis.  2.     History of hypercoagulable state with previous deep vein thromboses.  3.     Asthma.   POSTOPERATIVE DIAGNOSIS:    PROCEDURE PERFORMED:  Thrombectomy.    SUMMARY:  The patient today underwent thrombectomy of the right femoral vein using a ClotTriever device.  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  This may need to be addressed eventually as an outpatient.     7/19:    HEME/ONC:    38 y/o M PMH LLE DVT who is admitted for recurrent DVT and PE.     - reports he was taking care of his brother who just had a MI a few days ago in this hospital  - when we last talked in 01/2024 he was supposed to de-escalate to xarelto 10mg once daily after completing 6 months of therapeutic anticoagulation and complete his CT and US but he did not do this. He said he saw a primary care doctor who took him off xarelto and repeated hypercoagulable testing of which we do not have the results  - several days ago had right leg numbness and kept brushing it off but had progressive swelling and cramping and came to ED. Also noted SOB  - RLE with DVT in mid right posterior tibial vein with partially occlusive thrombus extending from distal right superficial femoral vein into proximal right posterior tibial vein  - CTA with extensive PE throughout right lung  - echo without R heart strain  - had a thrombectomy of R femoral vein 7/18  - now on heparin gtt, reports R leg feels better      Scheduled Medications    amphetamine-dextroamphetamine  20 mg Oral TID    montelukast  10 mg Oral Daily    clonazePAM  1 mg Oral TID    sodium chloride    Intravenous On Call    aspirin  324 mg Oral Once         Medication Infusions[]Expand by Default    continuous dose heparin 2,300 Units/hr (07/19/24 0333)    sodium chloride 50 mL/hr at 07/18/24 1757            Vital Signs:  Height: --  Weight: 145.2 kg (320 lb) (07/18 1801)  BSA (Calculated - sq m): --  Pulse: 58 (07/19 0342)  BP: 103/67 (07/19 0342)  Temp: 97.9 °F (36.6 °C) (07/19 0342)  Do Not Use - Resp Rate: --  SpO2: 98 % (07/19 0342)    Physical Examination:  General: Patient is alert and oriented, not in acute distress  Psych: Mood and affect are appropriate  Eyes: EOMI, PERRL  ENT: Oropharynx is clear, no adenopathy  CV: no LE edema  Respiratory: Non-labored respirations  GI/Abd: Soft, non-tender  Neurological: Grossly intact   Skin: no rashes or petechiae     Lab 07/18/24  0507 07/18/24  1754 07/19/24  0647   WBC 6.6 7.6 6.2   HGB 14.2 13.8 14.1   HCT 40.0 39.3 40.1   .0 149.0* 146.0*  146.0*   MCV 88.3 88.9 90.1   RDW 11.8 11.9 11.9   NEPRELIM 3.90  --  3.18      Lab 07/18/24  0507 07/18/24  1754 07/19/24  0647    137 136   K 4.3 3.7 4.2    106 104   CO2 28.0 27.0 28.0   BUN 9 12 11   CREATSERUM 0.98 0.95 0.82   GLU 93 94 83   CA 9.4 9.4 9.0   TP 6.8  --  6.5   ALB 4.2  --  4.0   ALKPHO 52  --  54   AST 21  --  15   ALT 22  --  18   BILT 0.7  --  0.9       Impression & Plan:      RLE DVT  PE  - given recurrence, unprovoked x2, recommended lifelong anticoagulation  - had prior hypercoagulable testing - FVL, prothrombin gene mutation testing, as well as anti-cardiolipin and beta-glycoprotein antibody testing which were all negative  - send lupus anticoagulant testing as this could not be done before when he was on a DOAC  - check LLE doppler for interval assessment of DVT and new baseline  - continue heparin gtt for now; plan transition tomorrow to apixaban 10mg BID x 7 days followed by apixaban 5mg BID; rx sent     Dispo: likely tomorrow when transitioned to apixaban    MEDICATIONS  ADMINISTERED IN LAST 1 DAY:    heparin (Porcine) 19433 units/250mL infusion PE/DVT/THROMBUS CONTINUOUS       Date Action Dose Route User    7/19/2024 0333 New Bag 2,300 Units/hr Intravenous Sudeep Hobbs RN    7/18/2024 2122 Hi-Risk Rate/Dose Change 2,300 Units/hr Intravenous Sudeep Hobbs RN          HYDROcodone-acetaminophen (Norco) 5-325 MG per tab 2 tablet       Date Action Dose Route User    7/19/2024 0922 Given 2 tablet Oral Silke Powell RN    7/19/2024 0334 Given 2 tablet Oral Sudeep Hobbs RN    7/18/2024 2212 Given 2 tablet Oral Sudeep Hobbs RN    7/18/2024 1755 Given 2 tablet Oral Mark Mora RN       ondansetron (Zofran) 4 MG/2ML injection 4 mg       Date Action Dose Route User    7/19/2024 1237 Given 4 mg Intravenous Silke Powell RN          sodium chloride 0.9% infusion       Date Action Dose Route User    7/18/2024 1757 New Bag (none) Intravenous Mark Mora RN

## 2024-07-30 ENCOUNTER — TELEPHONE (OUTPATIENT)
Dept: HEMATOLOGY/ONCOLOGY | Facility: HOSPITAL | Age: 39
End: 2024-07-30

## 2024-07-30 NOTE — PAYOR COMM NOTE
--------------  DISCHARGE REVIEW    Payor: [employer] CHOICE/HMO/POS/EPO  Subscriber #:  [id number]  Authorization Number: [id number]    Admit date: 24  Admit time:   1:54 PM  Discharge Date: 2024  1:00 PM           [facility]     Gregory Bustillo Patient Status:  Inpatient    1985 MRN [id number]   Location [facility] 8NE-A Attending No att. providers found   Hosp Day # 1 PCP Israel Pelaez MD     Date of Admission: 2024  Date of Discharge:  2024     Discharge Disposition: Home or Self Care    Discharge Diagnosis:  #acute RLE DVT - extensive  #acute PE w/ R heart strain on CT  -cards - d/w cards. S/p thrombectomy   -ECHO neg for heart strain   -heme consult  -outpt hypercoag labs neg  -life long AC  -hep gtt, transition to doac      #LLE pain  -LLE doppler neg     #Asthma/COPD  #ADHD    History of Present Illness: Gregory Bustillo is a 39 year old male with pMH DVT, asthma, ADHD, COPD, who p/w calf pain and sob. Has had 2 days of R calf pain similar to previous DVT 10 months ago. Has been off of eliquis for several months. No provoking factors. Today developed sob. Just had hypercoag labs ordered this week which are pending. Has multiple family members w/ clotting d/o's.           Brief Synopsis: pt w/ acute R DVT and PE. Concern for heart strain on CT but neg on echo. Extensive clot in leg. Underwent thrombectomy. Tolerated well. Hep gtt transitioned to DOAC. Lifelong. F/u heme for hypercoag w/u.     Lace+ Score: 50  59-90 High Risk  29-58 Medium Risk  0-28   Low Risk       TCM Follow-Up Recommendation:  LACE 29-58: Moderate Risk of readmission after discharge from the hospital.      Procedures during hospitalization:   thrombectomy    Incidental or significant findings and recommendations (brief descriptions):  none    Lab/Test results pending at Discharge:   none    Consultants:  mic Rois    Discharge Medication List:     Discharge  Medications        START taking these medications        Instructions Prescription details   apixaban 5 MG Tabs  Commonly known as: Eliquis      Take 2 tabs (10mg) by mouth twice daily for 7 days, then take 1 tab (5mg) by mouth twice daily thereafter.   Quantity: 74 tablet  Refills: 0            CONTINUE taking these medications        Instructions Prescription details   albuterol 108 (90 Base) MCG/ACT Aers  Commonly known as: Ventolin HFA      Inhale 1 puff into the lungs every 4 (four) hours as needed.   Refills: 0     amphetamine-dextroamphetamine 20 MG Tabs  Commonly known as: Adderall      Take 1 tablet by mouth 3 (three) times daily.   Refills: 0     clonazePAM 2 MG Tabs  Commonly known as: KlonoPIN      Take 0.5 tablets (1 mg total) by mouth 3 (three) times daily.   Refills: 0     montelukast 10 MG Tabs  Commonly known as: Singulair      Take 1 tablet (10 mg total) by mouth daily.   Refills: 0               Where to Get Your Medications        These medications were sent to [id number] - [place], [place] - [address] AT [facility] [address], [phone], [phone]  [address] [facility] [address]      Phone: [phone]   apixaban 5 MG Tabs         [place]    Follow-up appointment:   Chapin Pedro MD  [address] [address] [phone]    Follow up in 1 month(s)  Our office will call to schedule appointment    Israel Pelaez MD  [address] [facility] [address] [phone]    Schedule an appointment as soon as possible for a visit      Appointments for Next 30 Days 7/20/2024 - 8/19/2024        Date Arrival Time Visit Type Length Department Provider     8/2/2024  9:00 AM  [facility] CTA GATED COR ART W STIVEN SC [1900] 30 min [facility] CT [facility] CT RM1 CARD     8/2/2024  9:00 AM  [facility] CTA GATED COR ART W STIVEN SC [[id number]] 30 min [facility] X-ray [facility] RN RADIOLOGY 3    Patient Instructions:     Please arrive 15 minutes prior to your  scheduled appointment time.    No exercise or caffeine after dinner the night before until after the exam. You may have a light breakfast.  In the event you would like staff to administer Xanax to help you relax for the procedure, please make sure you have an adult to drive you home. If you want Xanax but do not bring a , you will not be given Xanax.   If you received a call from the ordering Doctor to take medication prior to the procedure, you may take those medications as prescribed. If your heart rate is above 60 on arrival, you will be given oral Metoprolol to lower your heart rate. We will then re-evaluate your heart rate 45 minutes to 1 hour after Metoprolol was given. We take CT images when your heart rate is between 55-60 to ensure the most optimal images. Additional doses may need to be given.  You will be here a minimum of 1 hour. Depending on how your heart rate responds to the medication, the procedure could take up to 4 hours.  If you are on a Phosphodiesterase Inhibitor and/or Erectile Dysfunction medication such as Cialis, Viagra, or Levitra, it must be held 72 hours prior to the procedure until after the procedure. If you have taken any of those medications within 72 hours of the procedure, please call Central Scheduling at [phone] to reschedule your appointment.      Location Instructions:     Your appointment will be at [facility] [address] [facility] in [place], [place]. For self-parking please park in the Blue Lot. There is also  parking outside the Main Entrance. Enter the door that says Main Entrance. Inside the building walk to the left and check in with Diagnostic Main. The phone number for this location is [phone].  If you suspect you may be pregnant please consult with your physician prior to your exam.&nbsp; If you have a continuous glucose monitor you will be asked to remove it during the exam.  Please bring your insurance card and photo ID. You will  also need to bring your doctor's order unless your physician's office submitted the order electronically or faxed the order. Without the order, your test may be delayed or postponed.&nbsp; Certain health screening tests may not require an order.  Because of the nature of the Emergency Department, please be advised of the possibility that your appointment may be delayed.  Children: Children under the age of 12 must have another adult caregiver with them.&nbsp; Please do not bring your child/children without a caregiver.&nbsp; Because of the highly sensitive equipment and privacy of all our patients, children will not be permitted in the exam rooms, unless otherwise noted and in accordance with departmental policy.  PATIENT RESPONSIBILITY ESTIMATE  - (Estimate) We will provide you with your estimated remaining deductible and coinsurance balance for your services at the time of check in.  - (Payment) Please be aware that you may be asked for payment at the time of service.  Masks are optional for all patients and visitors, unless otherwise indicated.               8/6/2024 11:15 AM  POST-HOSPITAL FOLLOW-UP [[id number]] 15 min [facility] in [place] Teodoro Werner MD    Patient Instructions:         Location Instructions:     **IF YOU NEED LABWORK OR AN INFUSION ALONG WITH YOUR APPOINTMENT, YOU MUST CALL TO SCHEDULE.**  Your appointment is on the [facility] campus in the [facility]. The address is [address] [place] [address]. Please register at the [facility]  on the second floor.  Masks are optional for all patients and visitors, unless otherwise indicated.                      Vital signs:  Temp:  [98.4 °F (36.9 °C)-98.6 °F (37 °C)] 98.5 °F (36.9 °C)  Pulse:  [56-75] 75  Resp:  [16-18] 18  BP: (104-115)/(60-71) 115/71  SpO2:  [95 %-98 %] 96 %    Physical Exam:    General: No acute distress   Lungs: clear to auscultation  Cardiovascular: S1, S2  Abdomen:  Soft      -----------------------------------------------------------------------------------------------  PATIENT DISCHARGE INSTRUCTIONS: See electronic chart    Gerson Arriaza MD    Total time spent on discharge plannin minutes     The  [employer] Cures Act makes medical notes like these available to patients in the interest of transparency. Please be advised this is a medical document. Medical documents are intended to carry relevant information, facts as evident, and the clinical opinion of the practitioner. The medical note is intended as peer to peer communication and may appear blunt or direct. It is written in medical language and may contain abbreviations or verbiage that are unfamiliar.     Electronically signed by Gerson Arriaza MD on 2024  5:52 PM         REVIEWER COMMENTS

## 2024-07-30 NOTE — TELEPHONE ENCOUNTER
----- Message from Noelle GIBBS sent at 7/30/2024  2:49 PM CDT -----  Regarding: FW: Pt requested to switch    ----- Message -----  From: Teodoro Burdick MD  Sent: 7/30/2024   2:03 PM CDT  To: Teodoro Werner MD; Noelle Lawler RN  Subject: RE: Pt requested to switch                       I am fine with a transfer if ok with Bertin.  ----- Message -----  From: Noelle Lawler, MARCIE  Sent: 7/30/2024  11:53 AM CDT  To: Teodoro Burdick MD  Subject: FW: Pt requested to switch                       Dx of unprovoked DVT, on Xarelto 10 mg. Currently seeing Dr Werner, would like to transfer care to you. Let me know if you agree and I can have him scheduled.  ----- Message -----  From: Oralia Alvarado, RN  Sent: 7/30/2024  10:34 AM CDT  To: Yadi Haas; Donal Burdick Rns  Subject: RE: Pt requested to switch                       We don't care. Up to Keegan/Bertin  ----- Message -----  From: Yadi Haas  Sent: 7/30/2024  10:09 AM CDT  To: Donal Werner Rns; Donal Burdick Rns  Subject: Pt requested to switch                           Pt would like to now see Dr Burdick.  Please let me know if all are in agreement.

## 2024-08-02 ENCOUNTER — HOSPITAL ENCOUNTER (OUTPATIENT)
Dept: CT IMAGING | Facility: HOSPITAL | Age: 39
Discharge: HOME | End: 2024-08-02
Attending: STUDENT IN AN ORGANIZED HEALTH CARE EDUCATION/TRAINING PROGRAM
Payer: COMMERCIAL

## 2024-08-02 VITALS
WEIGHT: 315 LBS | DIASTOLIC BLOOD PRESSURE: 69 MMHG | RESPIRATION RATE: 16 BRPM | HEIGHT: 74 IN | BODY MASS INDEX: 40.43 KG/M2 | HEART RATE: 55 BPM | SYSTOLIC BLOOD PRESSURE: 116 MMHG

## 2024-08-02 DIAGNOSIS — R07.9 CHEST PAIN, UNSPECIFIED TYPE: ICD-10-CM

## 2024-08-02 DIAGNOSIS — Z82.49 FAMILY HISTORY OF ISCHEMIC HEART DISEASE: ICD-10-CM

## 2024-08-02 DIAGNOSIS — E78.2 MIXED HYPERLIPIDEMIA: ICD-10-CM

## 2024-08-02 PROCEDURE — 75574 CT ANGIO HRT W/3D IMAGE: CPT | Performed by: STUDENT IN AN ORGANIZED HEALTH CARE EDUCATION/TRAINING PROGRAM

## 2024-08-02 RX ORDER — NITROGLYCERIN 0.4 MG/1
0.4 TABLET SUBLINGUAL ONCE
Status: COMPLETED | OUTPATIENT
Start: 2024-08-02 | End: 2024-08-02

## 2024-08-02 RX ORDER — DILTIAZEM HYDROCHLORIDE 5 MG/ML
5 INJECTION INTRAVENOUS SEE ADMIN INSTRUCTIONS
Status: DISCONTINUED | OUTPATIENT
Start: 2024-08-02 | End: 2024-08-04

## 2024-08-02 RX ORDER — METOPROLOL TARTRATE 1 MG/ML
5 INJECTION, SOLUTION INTRAVENOUS SEE ADMIN INSTRUCTIONS
Status: DISCONTINUED | OUTPATIENT
Start: 2024-08-02 | End: 2024-08-04

## 2024-08-02 RX ADMIN — NITROGLYCERIN 0.4 MG: 0.4 TABLET SUBLINGUAL at 09:12:00

## 2024-08-02 NOTE — IMAGING NOTE
TO RAD HOLDING AT 0835       HX TAKEN: chest pain, family hx, and recent hx DVT and PE.     PT CONSENTED AT 0845     BASELINE VITAL SIGNS: HR 55  /69    CTA ORDERED BY DR. BRANNON WAS PT GIVEN CTA PREMEDS NO, IF YES  100 MG PO METOPROLOL X2 DOSES    18 GAUGE IV STARTED AT 0900 7/19/24 SERUM TESTING COMPLETED GFR = >60   CREATINE = 0.82    TO CT TABLE @ 0910    CONNECT TO MONITOR  HR 58  /65      NITROGLYCERIN 0.4 MILLIGRAMS SUBLINGUAL GIVEN AT 0912     CALCIUM SCORE COMPLETED AT 0916     INJECTION STARTED AT 0919 HR 44 DURING SCAN     Per Mary CT linda, will need to reinject due to poor contrast saturation.   INJECTION STARTED AT 0931 HR 53 DURING SCAN PROCEDURE COMPLETE    POST SCAN HR 53 /61 AT 0933    0955 PT TO HOLDING AREA  VS  HR 48  /77     AVS  PROVIDED      0958  DISCHARGED HOME COMFORTABLY. Patient denies lightheadedness or dizziness at this time.

## 2024-08-06 ENCOUNTER — APPOINTMENT (OUTPATIENT)
Dept: HEMATOLOGY/ONCOLOGY | Facility: HOSPITAL | Age: 39
End: 2024-08-06
Attending: INTERNAL MEDICINE
Payer: COMMERCIAL

## 2024-08-18 DIAGNOSIS — I26.99 OTHER ACUTE PULMONARY EMBOLISM, UNSPECIFIED WHETHER ACUTE COR PULMONALE PRESENT (HCC): ICD-10-CM

## 2024-08-19 DIAGNOSIS — I26.99 OTHER ACUTE PULMONARY EMBOLISM, UNSPECIFIED WHETHER ACUTE COR PULMONALE PRESENT (HCC): ICD-10-CM

## 2024-08-19 RX ORDER — APIXABAN 5 MG/1
TABLET, FILM COATED ORAL
Qty: 74 TABLET | Refills: 0 | OUTPATIENT
Start: 2024-08-19

## 2024-08-19 NOTE — TELEPHONE ENCOUNTER
From: Gregory Bustillo  To: Office of Teodoro Werner  Sent: 8/19/2024 1:02 PM CDT  Subject: Medication Renewal Request    Refills have been requested for the following medications:     apixaban 5 MG Oral Tab [Teodoro Werner]   Patient Comment: I am out of meds I need my refill im not sure why you denied it when the [employer] sent the request . I dont see the new doc until 8/29    Preferred pharmacy: [id number] [place] [address] AT [facility] OF [address], [phone], [phone]

## 2024-08-29 ENCOUNTER — OFFICE VISIT (OUTPATIENT)
Dept: HEMATOLOGY/ONCOLOGY | Age: 39
End: 2024-08-29
Attending: INTERNAL MEDICINE
Payer: COMMERCIAL

## 2024-08-29 VITALS
DIASTOLIC BLOOD PRESSURE: 84 MMHG | SYSTOLIC BLOOD PRESSURE: 129 MMHG | TEMPERATURE: 98 F | OXYGEN SATURATION: 99 % | HEART RATE: 66 BPM | BODY MASS INDEX: 43 KG/M2 | RESPIRATION RATE: 18 BRPM | WEIGHT: 315 LBS

## 2024-08-29 DIAGNOSIS — I82.401 ACUTE DEEP VEIN THROMBOSIS (DVT) OF RIGHT LOWER EXTREMITY, UNSPECIFIED VEIN (HCC): Primary | ICD-10-CM

## 2024-08-29 DIAGNOSIS — R91.1 PULMONARY NODULE: ICD-10-CM

## 2024-08-29 DIAGNOSIS — I82.409 RECURRENT DEEP VEIN THROMBOSIS (DVT) (HCC): ICD-10-CM

## 2024-08-29 PROCEDURE — 99215 OFFICE O/P EST HI 40 MIN: CPT | Performed by: INTERNAL MEDICINE

## 2024-08-29 NOTE — PROGRESS NOTES
Cancer Center Progress Note    Problem List:      Patient Active Problem List   Diagnosis    Allergic rhinitis due to other allergen    Anxiety    Attention deficit disorder    Esophageal reflux    Hypersomnia    Midline low back pain without sciatica    Mild intermittent asthma (HCC)    Panic attack    Sleep apnea    Tinea pedis    Tobacco use disorder    Perirectal abscess    Perirectal fistula    Anal fistula    Other acute pulmonary embolism, unspecified whether acute cor pulmonale present (HCC)    Acute deep vein thrombosis (DVT) of proximal vein of right lower extremity (HCC)    DVT (deep venous thrombosis) ([facility])    Anticoagulated       Interim History:    Gregory Bustillo presents today for evaluation and management of a diagnosis of recurrent DVT.    39 year old man has a history of a first venous thrombosis in 11/2023 when he had a left leg DVT in the posterior tibial vein. At that time he was negative for pulmonary embolism. He was treated with apixaban for 6 months but he opted to stop the anticoagulation. He now presented on 7/18/2024 with right leg swelling and pain. The venous doppler of the right leg showed a DVT in the right superficial femoral vein and tibial vein. He had a CTA of the chest that showed extensive pulmonary embolism in the right lung. He was seen by cardiology and he had thrombectomy of the right femoral vein and popliteal vein. He now has been on apixaban. He initially had 10 mg BID x 7 days and then has been on 5 mg BID thereafter. He says he has strict compliance with the medication. He has not missed any doses. He has no complaint of bleeding. He has resolution of the leg symptoms. He has no chest pain and no dyspnea. He has no fever or sweats. He has no weight loss.      Review of Systems:   Constitutional: Negative for anorexia, fatigue, fevers, chills, night sweats and weight loss.  Eyes: Negative for visual disturbance, irritation and redness.  Respiratory: Negative for  cough, hemoptysis, chest pain, or dyspnea.  Cardiovascular: Negative for angina, orthopnea or palpitations.  Gastrointestinal: Negative for nausea, vomiting, change in bowel habits, diarrhea, constipation and abdominal pain.  Integument/breast: Negative for rash, skin lesions, and pruritus.  Hematologic/lymphatic: Negative for easy bruising, bleeding, and lymphadenopathy.  Musculoskeletal: Negative for myalgias, arthralgias, muscle weakness.  Genitourinary: Negative for dysuria or hematuria  Neurological: Negative for headaches, dizziness, speech problems, gait problems and focal weakness.  Psychiatric: The patient's mood was calm and appropriate for this visit.  The pertinent positives and negatives were described. All other systems were negative.    PMH/PSH:  Past Medical History:    ADHD    Anxiety    Asthma ([facility])    Attention deficit hyperactivity disorder (ADHD)    Atypical mole    Back pain    COPD (chronic obstructive pulmonary disease) ([facility])    Decorative tattoo    Esophageal reflux    Fatigue    Hearing impairment    NO APPLIANCE    Hearing loss    Heartburn    Hemorrhoids    Lab test positive for detection of COVID-19 virus    11/2021- NO HOSPITALIZATION- ASYMPTOMATIC    Perirectal fistula    Stool incontinence    Vertigo       Past Surgical History:   Procedure Laterality Date    Adenoidectomy      Colonoscopy N/A 01/10/2023    Procedure: ENDOSCOPIC ULTRASOUND (EUS), COLONOSCOPY;  Surgeon: Ervin Pittman MD;  Location:  ENDOSCOPY    Other      gastric sleeve 2019    Tonsillectomy         Family History Reviewed:  Family History   Problem Relation Age of Onset    Heart Attack Father     Mental Disorder Mother     Stroke Mother     Hypertension Mother     Bleeding Disorders Mother     Diabetes Maternal Grandmother     Breast Cancer Maternal Grandmother     Breast Cancer Paternal Grandmother     Colon Cancer Maternal Uncle     Colon Cancer Paternal Uncle        Allergies:     Allergies   Allergen  Reactions    Dust Mite Extract Coughing    Other RASH     Steel and nickel plated metals       Medications:   apixaban 5 MG Oral Tab Take 5mg twice daily. 60 tablet 0    montelukast 10 MG Oral Tab Take 1 tablet (10 mg total) by mouth daily.      clonazePAM 2 MG Oral Tab Take 0.5 tablets (1 mg total) by mouth 3 (three) times daily.      amphetamine-dextroamphetamine 20 MG Oral Tab Take 1 tablet by mouth 3 (three) times daily.      albuterol 108 (90 Base) MCG/ACT Inhalation Aero Soln Inhale 1 puff into the lungs every 4 (four) hours as needed.           Vital Signs:      Height: --  Weight: 151 kg (333 lb) (08/29 1307)  BSA (Calculated - sq m): --  Pulse: 66 (08/29 1307)  BP: 129/84 (08/29 1307)  Temp: 98 °F (36.7 °C) (08/29 1307)  Do Not Use - Resp Rate: --  SpO2: 99 % (08/29 1307)        Physical Examination:    Constitutional: Patient is alert and oriented x 3, not in acute distress.  Respiratory: Clear to auscultation and percussion. No rales.  No wheezes.  Cardiovascular: Regular rate and rhythm. No murmurs.  Gastrointestinal: Soft, non tender with good bowel sounds.  Musculoskeletal: No edema. No calf tenderness.  Psychiatric: The patient's mood is calm and appropriate for this visit.      Labs reviewed at this visit:     Lab Results   Component Value Date    WBC 6.2 07/19/2024    RBC 4.45 07/19/2024    HGB 14.1 07/19/2024    HCT 40.1 07/19/2024    MCV 90.1 07/19/2024    MCH 31.7 07/19/2024    MCHC 35.2 07/19/2024    RDW 11.9 07/19/2024    .0 (L) 07/20/2024     Lab Results   Component Value Date     07/19/2024    K 4.2 07/19/2024     07/19/2024    CO2 28.0 07/19/2024    BUN 11 07/19/2024    CREATSERUM 0.82 07/19/2024    GLU 83 07/19/2024    CA 9.0 07/19/2024    ALKPHO 54 07/19/2024    ALT 18 07/19/2024    AST 15 07/19/2024    BILT 0.9 07/19/2024    ALB 4.0 07/19/2024    TP 6.5 07/19/2024       Radiologic imaging reviewed at this visit:    US venous doppler on 7/18/2024:  FINDINGS:    EXTREMITY  EXAMINED:  Right lower extremity  SAPHENOFEMORAL JUNCTION:  No reflux.  THROMBI:  Occlusive thrombus in the mid right posterior tibial vein with partially occlusive thrombus extending from the distal right superficial femoral vein into the proximal right posterior tibial vein is noted compatible with deep vein thrombosis.     Impression   CONCLUSION:  Deep vein thrombosis in the right lower extremity as above.       CTA Chest on 7/18/2024:  FINDINGS:    VASCULATURE:  Acute pulmonary embolism extending from the right main pulmonary artery into multiple segmental and subsegmental branches throughout the right lung is noted.  No left pulmonary emboli are noted.  Mild right heart strain is noted.  THORACIC AORTA:  No aneurysm or visible dissection.    LUNGS:  No visible pulmonary disease.    MEDIASTINUM:  No adenopathy or mass.    WALT:  No mass or adenopathy.    CARDIAC:  No enlargement, pericardial thickening, or significant coronary artery calcification.  PLEURA:  No mass or effusion.    CHEST WALL:  No mass or axillary adenopathy.    LIMITED ABDOMEN:  Limited images of the upper abdomen are unremarkable.    BONES:  No bony lesion or fracture.    OTHER:  Negative.       Impression   CONCLUSION:  Extensive pulmonary embolism throughout the right lung.  Minimal right heart strain is noted.  Clinical correlation is advised.     This report was communicated by telephone to Dr. Stauffer at the dictation time shown below.         Assessment/Plan:     Recurrent lower extremity DVT:  Left leg posterior tibial vein DVT in 11/2023  Right leg proximal vein DVT and pulmonary embolism in 7/2024:  S/P thrombectomy on 7/18/2024  Apixaban re-started 7/20/2024    The patient has had recurrent VTE. He has had negative lupus anticoagulant testing. I would recommend indefinite anticoagulation. I favor continuing therapeutic dosing indefinitely. He will continue apixaban 5 mg BID. I recommended that he return in 6 months with repeat  venous doppler of the lower extremities to serve as a baseline study. I will also get a Ddimer at that time. He will return at that time. We could consider once daily rivaroxaban dosing at that time in order to optimize long term compliance. He was comfortable with this plan.    This visit lasted 45 minutes with 5 minutes for pre visit charting, 35 minutes for history, exam and management discussion, and 5 minutes for post visit charting.      Teodoro Burdick MD

## 2024-08-29 NOTE — PROGRESS NOTES
Patient here for consultation for bilateral DVT and PE. Former Dr Werner patient. Patient taking eliquis 5 mg bid as directed with no reported issues. Last DVT/PE 7/18/24. Patient denies pain, redness, swelling, chest pain or dyspnea. Last US was 7/18/24    Education Record    Learner:  Patient    Disease / Diagnosis: Hx of DVT/PE    Barriers / Limitations:  None   Comments:    Method:  Discussion   Comments:    General Topics:  Medication, Side effects and symptom management, and Plan of care reviewed   Comments:    Outcome:  Shows understanding   Comments:

## 2025-07-03 ENCOUNTER — HOSPITAL ENCOUNTER (EMERGENCY)
Facility: HOSPITAL | Age: 40
Discharge: HOME | End: 2025-07-03
Attending: EMERGENCY MEDICINE
Payer: COMMERCIAL

## 2025-07-03 ENCOUNTER — APPOINTMENT (OUTPATIENT)
Dept: GENERAL RADIOLOGY | Facility: HOSPITAL | Age: 40
End: 2025-07-03
Attending: EMERGENCY MEDICINE
Payer: COMMERCIAL

## 2025-07-03 ENCOUNTER — APPOINTMENT (OUTPATIENT)
Dept: ULTRASOUND IMAGING | Facility: HOSPITAL | Age: 40
End: 2025-07-03
Attending: EMERGENCY MEDICINE
Payer: COMMERCIAL

## 2025-07-03 ENCOUNTER — APPOINTMENT (OUTPATIENT)
Dept: CT IMAGING | Facility: HOSPITAL | Age: 40
End: 2025-07-03
Attending: EMERGENCY MEDICINE
Payer: COMMERCIAL

## 2025-07-03 VITALS
HEART RATE: 57 BPM | HEIGHT: 74 IN | DIASTOLIC BLOOD PRESSURE: 60 MMHG | WEIGHT: 315 LBS | SYSTOLIC BLOOD PRESSURE: 105 MMHG | BODY MASS INDEX: 40.43 KG/M2 | OXYGEN SATURATION: 100 % | RESPIRATION RATE: 16 BRPM | TEMPERATURE: 98 F

## 2025-07-03 DIAGNOSIS — M25.562 ARTHRALGIA OF BOTH LOWER LEGS: Primary | ICD-10-CM

## 2025-07-03 DIAGNOSIS — M25.561 ARTHRALGIA OF BOTH LOWER LEGS: Primary | ICD-10-CM

## 2025-07-03 DIAGNOSIS — R06.00 DYSPNEA, UNSPECIFIED TYPE: ICD-10-CM

## 2025-07-03 LAB
ALBUMIN SERPL-MCNC: 4 G/DL (ref 3.2–4.8)
ALBUMIN/GLOB SERPL: 1.7 {RATIO} (ref 1–2)
ALP LIVER SERPL-CCNC: 43 U/L (ref 45–117)
ALT SERPL-CCNC: 39 U/L (ref 10–49)
ANION GAP SERPL CALC-SCNC: 7 MMOL/L (ref 0–18)
APTT PPP: 28.8 SECONDS (ref 23–36)
AST SERPL-CCNC: 26 U/L (ref ?–34)
BASOPHILS # BLD AUTO: 0.02 X10(3) UL (ref 0–0.2)
BASOPHILS NFR BLD AUTO: 0.4 %
BILIRUB SERPL-MCNC: 0.8 MG/DL (ref 0.3–1.2)
BUN BLD-MCNC: 12 MG/DL (ref 9–23)
CALCIUM BLD-MCNC: 9.3 MG/DL (ref 8.7–10.6)
CHLORIDE SERPL-SCNC: 105 MMOL/L (ref 98–112)
CO2 SERPL-SCNC: 27 MMOL/L (ref 21–32)
CREAT BLD-MCNC: 1.09 MG/DL (ref 0.7–1.3)
EGFRCR SERPLBLD CKD-EPI 2021: 90 ML/MIN/1.73M2 (ref 60–?)
EOSINOPHIL # BLD AUTO: 0.17 X10(3) UL (ref 0–0.7)
EOSINOPHIL NFR BLD AUTO: 3.4 %
ERYTHROCYTE [DISTWIDTH] IN BLOOD BY AUTOMATED COUNT: 11.8 %
GLOBULIN PLAS-MCNC: 2.4 G/DL (ref 2–3.5)
GLUCOSE BLD-MCNC: 93 MG/DL (ref 70–99)
HCT VFR BLD AUTO: 38.4 % (ref 39–53)
HGB BLD-MCNC: 13.5 G/DL (ref 13–17.5)
IMM GRANULOCYTES # BLD AUTO: 0.01 X10(3) UL (ref 0–1)
IMM GRANULOCYTES NFR BLD: 0.2 %
INR BLD: 1.16 (ref 0.8–1.2)
LYMPHOCYTES # BLD AUTO: 1.94 X10(3) UL (ref 1–4)
LYMPHOCYTES NFR BLD AUTO: 38.8 %
MCH RBC QN AUTO: 31.5 PG (ref 26–34)
MCHC RBC AUTO-ENTMCNC: 35.2 G/DL (ref 31–37)
MCV RBC AUTO: 89.5 FL (ref 80–100)
MONOCYTES # BLD AUTO: 0.45 X10(3) UL (ref 0.1–1)
MONOCYTES NFR BLD AUTO: 9 %
NEUTROPHILS # BLD AUTO: 2.41 X10 (3) UL (ref 1.5–7.7)
NEUTROPHILS # BLD AUTO: 2.41 X10(3) UL (ref 1.5–7.7)
NEUTROPHILS NFR BLD AUTO: 48.2 %
NT-PROBNP SERPL-MCNC: 40 PG/ML (ref ?–125)
OSMOLALITY SERPL CALC.SUM OF ELEC: 287 MOSM/KG (ref 275–295)
PLATELET # BLD AUTO: 163 10(3)UL (ref 150–450)
POTASSIUM SERPL-SCNC: 4.1 MMOL/L (ref 3.5–5.1)
PROT SERPL-MCNC: 6.4 G/DL (ref 5.7–8.2)
PROTHROMBIN TIME: 15 SECONDS (ref 11.6–14.8)
RBC # BLD AUTO: 4.29 X10(6)UL (ref 4.3–5.7)
SODIUM SERPL-SCNC: 139 MMOL/L (ref 136–145)
TROPONIN I SERPL HS-MCNC: <3 NG/L (ref ?–53)
WBC # BLD AUTO: 5 X10(3) UL (ref 4–11)

## 2025-07-03 PROCEDURE — 71045 X-RAY EXAM CHEST 1 VIEW: CPT | Performed by: EMERGENCY MEDICINE

## 2025-07-03 PROCEDURE — 85610 PROTHROMBIN TIME: CPT | Performed by: EMERGENCY MEDICINE

## 2025-07-03 PROCEDURE — 83880 ASSAY OF NATRIURETIC PEPTIDE: CPT | Performed by: EMERGENCY MEDICINE

## 2025-07-03 PROCEDURE — 36415 COLL VENOUS BLD VENIPUNCTURE: CPT

## 2025-07-03 PROCEDURE — 84484 ASSAY OF TROPONIN QUANT: CPT | Performed by: EMERGENCY MEDICINE

## 2025-07-03 PROCEDURE — 80053 COMPREHEN METABOLIC PANEL: CPT | Performed by: EMERGENCY MEDICINE

## 2025-07-03 PROCEDURE — 99285 EMERGENCY DEPT VISIT HI MDM: CPT

## 2025-07-03 PROCEDURE — 93010 ELECTROCARDIOGRAM REPORT: CPT

## 2025-07-03 PROCEDURE — 85025 COMPLETE CBC W/AUTO DIFF WBC: CPT | Performed by: EMERGENCY MEDICINE

## 2025-07-03 PROCEDURE — 85730 THROMBOPLASTIN TIME PARTIAL: CPT | Performed by: EMERGENCY MEDICINE

## 2025-07-03 PROCEDURE — 93005 ELECTROCARDIOGRAM TRACING: CPT

## 2025-07-03 PROCEDURE — 93970 EXTREMITY STUDY: CPT | Performed by: EMERGENCY MEDICINE

## 2025-07-03 PROCEDURE — 71260 CT THORAX DX C+: CPT | Performed by: EMERGENCY MEDICINE

## 2025-07-03 NOTE — ED QUICK NOTES
Rounding Completed    Plan of Care reviewed. Waiting for results.   Elimination needs assessed.  Vital signs obtained. Pt heading to CT scan at this time.    Bed is locked and in lowest position. Call light within reach.

## 2025-07-03 NOTE — ED PROVIDER NOTES
Patient Seen in: [facility] Emergency Department       The following individual(s) verbally consented to be recorded using ambient AI listening technology and understand that they can each withdraw their consent to this listening technology at any point by asking the clinician to turn off or pause the recording:    Patient name: Gregory Bustillo  Additional names:        History  Chief Complaint   Patient presents with    Leg Pain    Difficulty Breathing     Stated Complaint: leg pain, concern for DVT, on eliquis    Subjective:   HPI     Gregory Bustillo is a 39 year old male with a history of blood clots who presents with leg pain and shortness of breath.    He has been experiencing leg pain and shortness of breath since last night, similar to a previous episode of pulmonary embolism and deep vein thrombosis last year, which involved blood clots in his legs and lungs. This was treated with Eliquis and a clot extraction procedure from his leg.    He is currently taking Eliquis, 5 mg twice daily. Initially, blood clots occurred in his left leg and were treated with Eliquis for six months. After discontinuation, he experienced a recurrence of clots in his lungs and legs within two to three months, requiring further intervention.    He denies current chest pain but has shortness of breath and leg swelling since last night. He uses compression socks and engages in activities like walking and stretching to manage his symptoms. He also has a family history of blood clots.    During the review of symptoms, he denies chest pain but confirms shortness of breath and leg swelling since last night. He also feels faint.        Objective:     Past Medical History:    ADHD    Anxiety    Asthma (HCC)    Attention deficit hyperactivity disorder (ADHD)    Atypical mole    Back pain    COPD (chronic obstructive pulmonary disease) ([facility])    Decorative tattoo    DVT (deep venous thrombosis) ([facility])    Esophageal reflux     Fatigue    Hearing impairment    NO APPLIANCE    Hearing loss    Heartburn    Hemorrhoids    Lab test positive for detection of COVID-19 virus    11/2021- NO HOSPITALIZATION- ASYMPTOMATIC    Perirectal fistula    Pulmonary embolism (HCC)    Stool incontinence    Vertigo              Past Surgical History:   Procedure Laterality Date    Adenoidectomy      Colonoscopy N/A 01/10/2023    Procedure: ENDOSCOPIC ULTRASOUND (EUS), COLONOSCOPY;  Surgeon: Ervin Pittman MD;  Location:  ENDOSCOPY    Other      gastric sleeve 2019    Tonsillectomy                  Social History     Socioeconomic History    Marital status:    Tobacco Use    Smoking status: Former     Current packs/day: 1.00     Average packs/day: 1 pack/day for 7.0 years (7.0 ttl pk-yrs)     Types: Cigarettes    Smokeless tobacco: Former   Vaping Use    Vaping status: Former   Substance and Sexual Activity    Alcohol use: Yes    Drug use: Yes     Types: Cannabis     Social Drivers of Health     Food Insecurity: No Food Insecurity (7/18/2024)    Food Insecurity     Food Insecurity: Never true   Transportation Needs: No Transportation Needs (7/18/2024)    Transportation Needs     Lack of Transportation: No   Housing Stability: Low Risk  (7/18/2024)    Housing Stability     Housing Instability: No                                Physical Exam    ED Triage Vitals   BP 07/03/25 1038 126/76   Pulse 07/03/25 1038 69   Resp 07/03/25 1038 16   Temp 07/03/25 1313 97.5 °F (36.4 °C)   Temp src 07/03/25 1313 Temporal   SpO2 07/03/25 1038 99 %   O2 Device 07/03/25 1038 None (Room air)       Current Vitals:   Vital Signs  BP: 105/60  Pulse: 57  Resp: 16  Temp: 97.5 °F (36.4 °C)  Temp src: Temporal  MAP (mmHg): 74    Oxygen Therapy  SpO2: 100 %  O2 Device: None (Room air)            Physical Exam  Vitals and nursing note reviewed.   Constitutional:       Appearance: He is well-developed.   HENT:      Head: Normocephalic.   Cardiovascular:      Rate and Rhythm:  Normal rate and regular rhythm.      Heart sounds: Normal heart sounds. No murmur heard.  Pulmonary:      Effort: Pulmonary effort is normal.      Breath sounds: Normal breath sounds.   Abdominal:      General: Bowel sounds are normal.      Palpations: Abdomen is soft.      Tenderness: There is no abdominal tenderness. There is no guarding.   Musculoskeletal:         General: Normal range of motion.      Cervical back: Normal range of motion and neck supple.      Right lower leg: Tenderness present. Edema present.      Left lower leg: Tenderness present. Edema present.      Comments: Mild generalized edema and discomfort throughout both lower extremities   Lymphadenopathy:      Cervical: No cervical adenopathy.   Skin:     General: Skin is warm and dry.      Findings: No rash.   Neurological:      Mental Status: He is alert and oriented to person, place, and time.      Sensory: No sensory deficit.                ED Course  Labs Reviewed   COMP METABOLIC PANEL (14) - Abnormal; Notable for the following components:       Result Value    Alkaline Phosphatase 43 (*)     All other components within normal limits   CBC WITH DIFFERENTIAL WITH PLATELET - Abnormal; Notable for the following components:    RBC 4.29 (*)     HCT 38.4 (*)     All other components within normal limits   PROTHROMBIN TIME (PT) - Abnormal; Notable for the following components:    PT 15.0 (*)     All other components within normal limits   TROPONIN I HIGH SENSITIVITY - Normal   PRO BETA NATRIURETIC PEPTIDE - Normal   PTT, ACTIVATED - Normal   RAINBOW DRAW LAVENDER   RAINBOW DRAW LIGHT GREEN   RAINBOW DRAW BLUE     EKG    Rate, intervals and axes as noted on EKG Report.  Rate: 66  Rhythm: Sinus Rhythm  Reading: No acute ischemic abnormalities noted.           ED Course as of 07/03/25 1448  ------------------------------------------------------------  Time: 07/03 1149  Value: Troponin I (High Sensitivity): <3  Comment:  (Reviewed)  ------------------------------------------------------------  Time: 07/03 1149  Value: APTT: 28.8  Comment: (Reviewed)  ------------------------------------------------------------  Time: 07/03 1149  Value: INR: 1.16  Comment: (Reviewed)  ------------------------------------------------------------  Time: 07/03 1149  Value: CBC With Differential With Platelet(!)  Comment: Unremarkable    ------------------------------------------------------------  Time: 07/03 1149  Value: XR CHEST AP PORTABLE  (CPT=71045)  Comment: Images were independently viewed by me and no infiltrate noted.  Mediastinal and cardiac silhouettes were normal.    ------------------------------------------------------------  Time: 07/03 1244  Value: pro-BETA NATRIURETIC PEPTIDE: 40  Comment: (Reviewed)  ------------------------------------------------------------  Time: 07/03 1244  Value: Comp Metabolic Panel (14)(!)  Comment: Unremarkable    ------------------------------------------------------------  Time: 07/03 1434  Value: CT CHEST PE AORTA (IV ONLY) (CPT=71260)  Comment: No evidence of acute PE.  Cholelithiasis was noted.  ------------------------------------------------------------  Time: 07/03 1443  Value: US VENOUS DOPPLER LEG BILAT - DIAG IMG (CPT=93970)  Comment: No evidence of DVT                       MDM       Medical Decision Making  The patient, with a history of deep vein thrombosis (DVT) and pulmonary embolism (PE), presented to the Emergency Department with acute onset of leg pain, swelling, and shortness of breath since last night. The patient is currently on Eliquis 5 mg twice daily. Physical examination and imaging revealed no evidence of recurrent DVT or PE. An incidental finding of a gallstone was noted on imaging, but the patient denied any associated symptoms such as pain or nausea after eating.    Differential diagnosis includes, but is not limited to:  - Recurrent Deep Vein Thrombosis: Given the patient's  history of DVT and current symptoms of leg pain and swelling, recurrent DVT was considered but ruled out by ultrasound imaging.  - Recurrent Pulmonary Embolism: The patient's shortness of breath and history of PE raised suspicion for recurrent PE, but this was excluded by CT imaging of the chest.  - Gallstone without Cholecystitis: An incidental gallstone was found on imaging without symptoms of cholecystitis, thus not contributing to the current presentation.    Pulmonary embolism and deep vein thrombosis  No evidence of recurrent pulmonary embolism or deep vein thrombosis on current imaging.  - Continue current anticoagulation therapy with Eliquis 5 mg twice daily.  - Advise follow-up with primary care physician for ongoing management and monitoring.    Gallstone without cholecystitis  Incidental finding of gallstone on imaging without symptoms of pain or nausea associated with eating.  - Advise to monitor for symptoms such as pain or nausea after eating.  - Educate on potential need for cholecystectomy if symptoms develop.    Disposition: Home            Medical Decision Making      Disposition and Plan     Clinical Impression:  1. Arthralgia of both lower legs    2. Dyspnea, unspecified type         Disposition:  Discharge  7/3/2025  2:47 pm    Follow-up:  Israel Pelaez MD  [address] [facility] [address]  [phone]    Follow up            Medications Prescribed:  Current Discharge Medication List                Supplementary Documentation:

## 2025-07-03 NOTE — ED INITIAL ASSESSMENT (HPI)
PT states that he developed sudden pain (8/10 when walking) left leg  last night. PT adds that he is concerned due to hx of DVT (x2) and  PE (x1), taking Eliquis BID. PT adds shortness of breath since last night as well.

## 2025-07-04 LAB
ATRIAL RATE: 63 BPM
P AXIS: 8 DEGREES
P-R INTERVAL: 148 MS
Q-T INTERVAL: 418 MS
QRS DURATION: 94 MS
QTC CALCULATION (BEZET): 438 MS
R AXIS: 4 DEGREES
T AXIS: 13 DEGREES
VENTRICULAR RATE: 66 BPM

## (undated) DEVICE — PANTS KNIT WASHABLE 2/3XL

## (undated) DEVICE — Device: Brand: DEFENDO AIR/WATER/SUCTION AND BIOPSY VALVE

## (undated) DEVICE — HYDROGEN PEROXIDE 4 OZ

## (undated) DEVICE — PENCIL TELESCOPE MEGADYNE SE

## (undated) DEVICE — RECTAL CDS-LF: Brand: MEDLINE INDUSTRIES, INC.

## (undated) DEVICE — SUT SILK 2-0 FS 685G

## (undated) DEVICE — INTENDED TO BE USED TO OCCLUDE, RETRACT AND IDENTIFY ARTERIES, VEINS, TENDONS AND NERVES IN SURGICAL PROCEDURES: Brand: STERION®  VESSEL LOOP

## (undated) DEVICE — SPNG DRESS 8X4IN NLTX STRL 12

## (undated) DEVICE — GAUZE STERILE 4X4 12PLY

## (undated) DEVICE — ENDOSCOPY PACK UPPER: Brand: MEDLINE INDUSTRIES, INC.

## (undated) DEVICE — STERILE POLYISOPRENE POWDER-FREE SURGICAL GLOVES: Brand: PROTEXIS

## (undated) DEVICE — SOL NACL IRRIG 0.9% 1000ML BTL

## (undated) DEVICE — SLEEVE KENDALL SCD EXPRESS MED

## (undated) DEVICE — VIOLET BRAIDED (POLYGLACTIN 910), SYNTHETIC ABSORBABLE SUTURE: Brand: COATED VICRYL

## (undated) DEVICE — FILTERLINE NASAL ADULT O2/CO2

## (undated) DEVICE — ENDOSCOPY PACK - LOWER: Brand: MEDLINE INDUSTRIES, INC.

## (undated) DEVICE — BIOGUARD CLEANING ADAPTER

## (undated) DEVICE — PAD,ABDOMINAL,8"X7.5",ST,LF,20/BX: Brand: MEDLINE INDUSTRIES, INC.

## (undated) DEVICE — 3M™ RED DOT™ MONITORING ELECTRODE WITH FOAM TAPE AND STICKY GEL, 50/BAG, 20/CASE, 72/PLT 2570: Brand: RED DOT™

## (undated) DEVICE — SUT SILK 0 A186H

## (undated) DEVICE — 1200CC GUARDIAN II: Brand: GUARDIAN

## (undated) DEVICE — HYDROGEN PEROXIDE SOL 4OZ BTL

## (undated) DEVICE — FORCEP RADIAL JAW 4

## (undated) DEVICE — SUT ETHIBOND 5 CCS MB47G

## (undated) NOTE — LETTER
[address]  [address]  Consent for Procedure/Sedation  Date: 07/18/2024         Time: 1310    I hereby authorize Dr. Wallis/Aiyana, my physician and his/her assistants (if applicable), which may include medical students, residents, and/or fellows, to perform the following surgical operation/ procedure and administer such anesthesia as may be determined necessary by my physician:  Operation/Procedure name (s) Peripheral angiography, atherectomy, percutaneous transluminal angioplasty (PTA) and/or vascular stenting on Gregory Bustillo  2.   I recognize that during the surgical operation/procedure, unforeseen conditions may necessitate additional or different procedures than those listed above.  I, therefore, further authorize and request that the above-named surgeon, assistants, or designees perform such procedures as are, in their judgment, necessary and desirable.    3.   My surgeon/physician has discussed prior to my surgery the potential benefits, risks and side effects of this procedure; the likelihood of achieving goals; and potential problems that might occur during recuperation.  They also discussed reasonable alternatives to the procedure, including risks, benefits, and side effects related to the alternatives and risks related to not receiving this procedure.  I have had all my questions answered and I acknowledge that no guarantee has been made as to the result that may be obtained.    4.   Should the need arise during my operation/procedure, which includes change of level of care prior to discharge, I also consent to the administration of blood and/or blood products.  Further, I understand that despite careful testing and screening of blood or blood products by collecting agencies, I may still be subject to ill effects as a result of receiving a blood transfusion and/or blood products.  The following are some, but not all, of the potential risks that can occur:  fever and allergic reactions, hemolytic reactions, transmission of diseases such as Hepatitis, AIDS and Cytomegalovirus (CMV) and fluid overload.  In the event that I wish to have an autologous transfusion of my own blood, or a directed donor transfusion, I will discuss this with my physician.     Check only if Refusing Blood or Blood Products  I understand refusal of blood or blood products as deemed necessary by my physician may have serious consequences to my condition to include possible death. I hereby assume responsibility for my refusal and release the hospital, its personnel, and my physicians from any responsibility for the consequences of my refusal.      o  Refuse        5.   I authorize the use of any specimen, organs, tissues, body parts or foreign objects that may be removed from my body during the operation/procedure for diagnosis, research or teaching purposes and their subsequent disposal by hospital authorities.  I also authorize the release of specimen test results and/or written reports to my treating physician on the hospital medical staff or other referring or consulting physicians involved in my care, at the discretion of the Pathologist or my treating physician.    6.   I consent to the photographing or videotaping of the operations or procedures to be performed, including appropriate portions of my body for medical, scientific, or educational purposes, provided my identity is not revealed by the pictures or by descriptive texts accompanying them.  If the procedure has been photographed/videotaped, the surgeon will obtain the original picture, image, videotape or CD.  The hospital will not be responsible for storage, release or maintenance of the picture, image, tape or CD.    7.   I consent to the presence of a  or observers in the operating room as deemed necessary by my physician or their designees.    8.   I recognize that in the event my procedure results in extended  X-Ray/fluoroscopy time, I may develop a skin reaction.    9. If I have a Do Not Attempt Resuscitation (DNAR) order in place, that status will be suspended while in the operating room, procedural suite, and during the recovery period unless otherwise explicitly stated by me (or a person authorized to consent on my behalf). The surgeon or my attending physician will determine when the applicable recovery period ends for purposes of reinstating the DNAR order.  10. Patients having a sterilization procedure: I understand that if the procedure is successful the results will be permanent and it will therefore be impossible for me to inseminate, conceive, or bear children.  I also understand that the procedure is intended to result in sterility, although the result has not been guaranteed.   11. I acknowledge that my physician has explained sedation/analgesia administration to me including the risk and benefits I consent to the administration of sedation/analgesia as may be necessary or desirable in the judgment of my physician.    I CERTIFY THAT I HAVE READ AND FULLY UNDERSTAND THE ABOVE CONSENT TO OPERATION and/or OTHER PROCEDURE.      ____________________________________       _________________________________      ______________________________  Signature of Patient         Signature of Responsible Person        Printed Name of Responsible Person    ____________________________________      _________________________________      ______________________________       Signature of Witness          Relationship to Patient                       Date                                         Time  Patient Name: Gregory Bustillo  : 1985    Reviewed: 2024   Printed: 2024  Medical Record #: [id number] Page 1 of 1

## (undated) NOTE — LETTER
23    Patient: Katie Gonzalez  : 1985 Visit date: 2023    Dear  Leyla Lomeli MD    Thank you for referring Katie Gonzalez to my practice. Please find my assessment and plan below. Assessment   Perirectal fistula  (primary encounter diagnosis)    This is a very nice 44-year-old gentleman who is referred to me with concern for an anal fistula. Patient has been noticing intermittent drainage of pus and blood from his anus for the last 1 year. He has noticed a palpable lump just to the left of his anus that sometimes forms a palpable cord toward the anus. He has been able to express pus and blood from his anus when palpating over this area. He denies any external drainage. No prior perianal abscess. Patient recently had a colonoscopy performed by Dr. Nehal Schmitt on 1/10/2023. Findings included aphthous ulceration and erosions of the terminal ileum, otherwise normal colon and rectum aside from grade 2 internal hemorrhoids. Dr. Nehal Schmitt performed an endoscopic ultrasound with findings of a transsphincteric fistula tract. Pathology from endoscopic biopsies as below. Patient denies any chronic abdominal pain, diarrhea, unintentional weight loss or rectal bleeding. He generally has 1-2 formed bowel movements a day. He does have a significant family history of colon cancer on his mother side of the family. On external exam of the anus, there is a small palpable subcutaneous lump on the left lateral anoderm approximately 3 cm from the anal verge. No palpable cord toward the anus. No skin opening or external drainage. Digital rectal exam performed showing good sphincter tone, no masses, bleeding or palpable internal openings appreciated. Anoscopy reveals normal-appearing anorectal mucosa with small hemorrhoids, no visible internal opening, no visible drainage. 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   No visible external opening seen on exam today. Patient is not particularly bothered by the drainage. He does not have any clinical symptoms suggestive of Crohn's disease but he did have terminal ileitis on recent endoscopy. Plan  I will plan to discuss this patient's case with Dr. Merline Whelan from gastroenterology to see if he was planning to start any medical therapy for the terminal ileitis. From a surgical standpoint, I would recommend against surgery at this point as there is no external opening, no signs of infection and patient's symptoms are minimal.  I would not want to create an external opening and fistula. I will offer patient follow-up in 2 to 3 months for repeat examination. He should return to me sooner if symptoms change or worsen.       Sincerely,       Robert Walls MD   CC: No Recipients